# Patient Record
Sex: MALE | Race: ASIAN | NOT HISPANIC OR LATINO | URBAN - METROPOLITAN AREA
[De-identification: names, ages, dates, MRNs, and addresses within clinical notes are randomized per-mention and may not be internally consistent; named-entity substitution may affect disease eponyms.]

---

## 2017-07-09 ENCOUNTER — EMERGENCY (EMERGENCY)
Facility: HOSPITAL | Age: 47
LOS: 1 days | Discharge: HOME | End: 2017-07-09
Attending: EMERGENCY MEDICINE | Admitting: EMERGENCY MEDICINE
Payer: MEDICAID

## 2017-07-09 VITALS
OXYGEN SATURATION: 100 % | TEMPERATURE: 98 F | HEART RATE: 83 BPM | SYSTOLIC BLOOD PRESSURE: 145 MMHG | RESPIRATION RATE: 16 BRPM | DIASTOLIC BLOOD PRESSURE: 85 MMHG

## 2017-07-09 VITALS
RESPIRATION RATE: 18 BRPM | HEART RATE: 78 BPM | SYSTOLIC BLOOD PRESSURE: 116 MMHG | DIASTOLIC BLOOD PRESSURE: 94 MMHG | OXYGEN SATURATION: 100 %

## 2017-07-09 PROCEDURE — 99284 EMERGENCY DEPT VISIT MOD MDM: CPT | Mod: 25

## 2017-07-09 RX ORDER — KETOROLAC TROMETHAMINE 30 MG/ML
30 SYRINGE (ML) INJECTION ONCE
Qty: 0 | Refills: 0 | Status: DISCONTINUED | OUTPATIENT
Start: 2017-07-09 | End: 2017-07-09

## 2017-07-09 RX ADMIN — Medication 30 MILLIGRAM(S): at 05:44

## 2017-07-09 RX ADMIN — Medication 30 MILLIGRAM(S): at 06:15

## 2017-07-09 NOTE — ED PROVIDER NOTE - PHYSICAL EXAMINATION
mild tenderness of medial aspect of the elbow, no overlying skin changes, no edema. full range of motion, full strength. mild tenderness of medial aspect of the elbow, no overlying skin changes, no edema. full range of motion, full strength. no warmth  no LE edema, normal equal distal pulses.   sensation intact.

## 2017-07-09 NOTE — ED ADULT NURSE NOTE - CHIEF COMPLAINT
The patient is a 46y Male complaining of right elbow pain since March after banging elbow on tools in garage.

## 2017-07-09 NOTE — ED ADULT NURSE NOTE - OBJECTIVE STATEMENT
Received pt in room 28, ambulatory, pt A&Ox4, respirations even and unlabored b/l. Full ROM of right arm noted. Awaiting MD oconnor. Will continue to monitor.
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
## 2017-07-09 NOTE — ED PROVIDER NOTE - MEDICAL DECISION MAKING DETAILS
46yoM no pmh pw 3 months of medial elbow pain consistent with medial epicondylitis. will give IM toradaol for pain, discharge with [facility] follow up.

## 2017-07-09 NOTE — ED PROVIDER NOTE - PROGRESS NOTE DETAILS
PT seen and reassessed.  Patient symptomatically improved.   AAOX3, NAD, VSS.   Patient verbalized understanding of hospital course and outpatient plans, has decisional making capacity.  Will follow-up with Sports Medicine doctor in the next 5-7 days; patient will call for an appointment. Will return to the ED if there is any worsening of symptoms.  Patient able to ambulate w/o difficulty, is tolerating PO intake

## 2017-07-09 NOTE — ED PROVIDER NOTE - CARE PLAN
Principal Discharge DX:	Medial epicondylitis of elbow, right Principal Discharge DX:	Medial epicondylitis of elbow, right  Instructions for follow-up, activity and diet:	1) Please follow-up with your primary care doctor in the next 5-7 days.  Please call tomorrow for an appointment.  If you cannot follow-up with your primary care doctor please return to the ED for any urgent issues.  2) You were given a copy of the tests performed today.  Please bring the results with you and review them with your primary care doctor.  3) If you have any worsening of symptoms or any other concerns please return to the ED immediately.  4) Please continue taking your home medications as directed.

## 2017-07-09 NOTE — ED PROVIDER NOTE - ATTENDING CONTRIBUTION TO CARE
46M no PMh p/w positional R medial elbow pain x3mos. Works as . Vitals wnl, exam as above.  ddx: Likely MSK/medical epicondylitis.  Supportive care, outpt ortho f/u. Comfortable for dc.

## 2017-07-09 NOTE — ED ADULT TRIAGE NOTE - CHIEF COMPLAINT QUOTE
pt comes to ED for R elbow pain x 3 months. pt denies can move arm freely without difficultly. pt has elbow wrapped with ace bandage. pt denies going to ER, PCP, or taking anything for pain since it started. pt appears very comfortable. pt eating during triage. VSS

## 2017-07-18 ENCOUNTER — APPOINTMENT (OUTPATIENT)
Age: 47
End: 2017-07-18

## 2017-07-18 DIAGNOSIS — M77.01 MEDIAL EPICONDYLITIS, RIGHT ELBOW: ICD-10-CM

## 2017-07-18 RX ORDER — METHYLPREDNISOLONE 4 MG/1
4 TABLET ORAL
Qty: 21 | Refills: 0 | Status: ACTIVE | COMMUNITY
Start: 2017-07-18 | End: 1900-01-01

## 2017-08-15 ENCOUNTER — APPOINTMENT (OUTPATIENT)
Age: 47
End: 2017-08-15
Payer: MEDICAID

## 2017-08-15 PROCEDURE — 99213 OFFICE O/P EST LOW 20 MIN: CPT

## 2018-01-09 ENCOUNTER — APPOINTMENT (OUTPATIENT)
Age: 48
End: 2018-01-09
Payer: MEDICAID

## 2018-01-09 PROCEDURE — 76882 US LMTD JT/FCL EVL NVASC XTR: CPT

## 2018-01-09 PROCEDURE — 99214 OFFICE O/P EST MOD 30 MIN: CPT | Mod: 25

## 2018-01-09 PROCEDURE — 20553 NJX 1/MLT TRIGGER POINTS 3/>: CPT

## 2018-01-30 ENCOUNTER — APPOINTMENT (OUTPATIENT)
Age: 48
End: 2018-01-30
Payer: MEDICAID

## 2018-01-30 PROCEDURE — 76942 ECHO GUIDE FOR BIOPSY: CPT

## 2018-01-30 PROCEDURE — 20553 NJX 1/MLT TRIGGER POINTS 3/>: CPT

## 2018-01-30 PROCEDURE — 99213 OFFICE O/P EST LOW 20 MIN: CPT | Mod: 25

## 2018-02-20 ENCOUNTER — APPOINTMENT (OUTPATIENT)
Age: 48
End: 2018-02-20
Payer: MEDICAID

## 2018-02-20 DIAGNOSIS — M77.00 MEDIAL EPICONDYLITIS, UNSPECIFIED ELBOW: ICD-10-CM

## 2018-02-20 PROCEDURE — 20553 NJX 1/MLT TRIGGER POINTS 3/>: CPT

## 2018-02-20 PROCEDURE — 76942 ECHO GUIDE FOR BIOPSY: CPT

## 2018-02-20 PROCEDURE — 99214 OFFICE O/P EST MOD 30 MIN: CPT | Mod: 25

## 2018-03-13 ENCOUNTER — APPOINTMENT (OUTPATIENT)
Age: 48
End: 2018-03-13
Payer: MEDICAID

## 2018-03-13 PROCEDURE — 99213 OFFICE O/P EST LOW 20 MIN: CPT

## 2018-03-18 ENCOUNTER — EMERGENCY (EMERGENCY)
Facility: HOSPITAL | Age: 48
LOS: 1 days | Discharge: HOME | End: 2018-03-18
Admitting: EMERGENCY MEDICINE
Payer: MEDICAID

## 2018-03-18 VITALS
DIASTOLIC BLOOD PRESSURE: 78 MMHG | OXYGEN SATURATION: 97 % | SYSTOLIC BLOOD PRESSURE: 136 MMHG | HEART RATE: 99 BPM | TEMPERATURE: 98 F | RESPIRATION RATE: 16 BRPM

## 2018-03-18 PROCEDURE — 99283 EMERGENCY DEPT VISIT LOW MDM: CPT
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
## 2018-03-19 LAB
HIV1 AG SER QL: SIGNIFICANT CHANGE UP
HIV1+2 AB SPEC QL: SIGNIFICANT CHANGE UP
T PALLIDUM AB TITR SER: NEGATIVE — SIGNIFICANT CHANGE UP

## 2018-03-19 RX ORDER — CLOTRIMAZOLE AND BETAMETHASONE DIPROPIONATE 10; .5 MG/G; MG/G
1 CREAM TOPICAL
Qty: 60 | Refills: 0 | OUTPATIENT
Start: 2018-03-19 | End: 2018-03-25

## 2018-03-19 RX ORDER — CLOTRIMAZOLE AND BETAMETHASONE DIPROPIONATE 10; .5 MG/G; MG/G
1 CREAM TOPICAL ONCE
Qty: 0 | Refills: 0 | Status: COMPLETED | OUTPATIENT
Start: 2018-03-19 | End: 2018-03-19

## 2018-03-19 RX ADMIN — CLOTRIMAZOLE AND BETAMETHASONE DIPROPIONATE 1 APPLICATION(S): 10; .5 CREAM TOPICAL at 01:37

## 2018-03-19 NOTE — ED PROVIDER NOTE - GENITOURINARY [-], MLM
no dysuria or penile discharge no hematuria/no pelvic pain/no STD exposure/no difficulty urinating/no dysuria or penile discharge

## 2018-03-19 NOTE — ED PROVIDER NOTE - INGUINAL REGION
no swelling/erythema noted to the b/l inguinal region. No ulcerations. no chancre erythema noted to the b/l inguinal region. No ulcerations. no chancre/no swelling/no tenderness

## 2018-03-19 NOTE — ED PROVIDER NOTE - PLAN OF CARE
Follow up with your PMD within 48-72 hrs. Apply the cream to the area with rash 2x/day for 7-10 days.  Keep the area clean and dry- do not wear tight clothing. Worsening, continued or ANY new concerning symptoms return to the emergency department.   Call [phone] for results to your STD tests between 9am and 2:30pm
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
## 2018-03-19 NOTE — ED PROVIDER NOTE - CARE PLAN
Principal Discharge DX:	Tinea cruris  Assessment and plan of treatment:	Follow up with your PMD within 48-72 hrs. Apply the cream to the area with rash 2x/day for 7-10 days.  Keep the area clean and dry- do not wear tight clothing. Worsening, continued or ANY new concerning symptoms return to the emergency department.   Call [phone] for results to your STD tests between 9am and 2:30pm

## 2018-03-19 NOTE — ED PROVIDER NOTE - MEDICAL DECISION MAKING DETAILS
46 y/o M pt with no sig PMHx, presenting with Pruritis to the b/l groin area for 4 days. Likely Tenia Cruris. Plan to treat with antifungal cream. Educations. PMD f/u 48 y/o M pt with no sig PMHx, presenting with erythema and pruritis to the b/l groin area for 4 days. Likely Tenia Cruris. Plan to treat with antifungal cream. Draw GC/Chlamydia, HIV at pt request. Education. PMD f/u

## 2018-03-20 LAB
C TRACH RRNA SPEC QL NAA+PROBE: SIGNIFICANT CHANGE UP
N GONORRHOEA RRNA SPEC QL NAA+PROBE: SIGNIFICANT CHANGE UP
SPECIMEN SOURCE: SIGNIFICANT CHANGE UP

## 2018-04-03 ENCOUNTER — APPOINTMENT (OUTPATIENT)
Age: 48
End: 2018-04-03
Payer: MEDICAID

## 2018-04-03 DIAGNOSIS — M77.01 MEDIAL EPICONDYLITIS, RIGHT ELBOW: ICD-10-CM

## 2018-04-03 PROCEDURE — 99214 OFFICE O/P EST MOD 30 MIN: CPT | Mod: 25

## 2018-04-03 PROCEDURE — 76942 ECHO GUIDE FOR BIOPSY: CPT

## 2018-04-03 PROCEDURE — 20551 NJX 1 TENDON ORIGIN/INSJ: CPT

## 2018-05-01 ENCOUNTER — APPOINTMENT (OUTPATIENT)
Age: 48
End: 2018-05-01
Payer: MEDICAID

## 2018-05-01 PROCEDURE — 99214 OFFICE O/P EST MOD 30 MIN: CPT

## 2018-12-07 ENCOUNTER — EMERGENCY (EMERGENCY)
Facility: HOSPITAL | Age: 48
LOS: 1 days | Discharge: HOME | End: 2018-12-07
Attending: EMERGENCY MEDICINE | Admitting: EMERGENCY MEDICINE
Payer: MEDICAID

## 2018-12-07 VITALS
OXYGEN SATURATION: 95 % | HEART RATE: 129 BPM | TEMPERATURE: 98 F | RESPIRATION RATE: 16 BRPM | DIASTOLIC BLOOD PRESSURE: 107 MMHG | SYSTOLIC BLOOD PRESSURE: 166 MMHG
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
## 2018-12-07 DIAGNOSIS — S01.111A LACERATION WITHOUT FOREIGN BODY OF RIGHT EYELID AND PERIOCULAR AREA, INITIAL ENCOUNTER: ICD-10-CM

## 2018-12-07 DIAGNOSIS — S00.83XA CONTUSION OF OTHER PART OF HEAD, INITIAL ENCOUNTER: ICD-10-CM
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
## 2018-12-07 DIAGNOSIS — T74.11XA ADULT PHYSICAL ABUSE, CONFIRMED, INITIAL ENCOUNTER: ICD-10-CM
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
## 2018-12-07 DIAGNOSIS — S60.511A ABRASION OF RIGHT HAND, INITIAL ENCOUNTER: ICD-10-CM

## 2018-12-07 DIAGNOSIS — Z23 ENCOUNTER FOR IMMUNIZATION: ICD-10-CM

## 2018-12-07 PROCEDURE — 73130 X-RAY EXAM OF HAND: CPT | Mod: 26,RT

## 2018-12-07 PROCEDURE — 99284 EMERGENCY DEPT VISIT MOD MDM: CPT | Mod: 25

## 2018-12-07 RX ORDER — TETANUS TOXOID, REDUCED DIPHTHERIA TOXOID AND ACELLULAR PERTUSSIS VACCINE, ADSORBED 5; 2.5; 8; 8; 2.5 [IU]/.5ML; [IU]/.5ML; UG/.5ML; UG/.5ML; UG/.5ML
0.5 SUSPENSION INTRAMUSCULAR ONCE
Qty: 0 | Refills: 0 | Status: COMPLETED | OUTPATIENT
Start: 2018-12-07 | End: 2018-12-07
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
## 2018-12-07 NOTE — ED PROVIDER NOTE - MEDICAL DECISION MAKING DETAILS
Will CT head, face, C-spine.  Will maintain C-collar until after CT read.  Will xray R hand.  Pt requests small dose of Tylenol.  Ice applied.  Lac to R eyebrow is very small - will repair with dermabond.  Discussed wound care instructions and abrasions dressed with bacitracin.  Tetanus updated.      CT negative for acute pathology.  Discussed chronic T1 spinous process fracture with pt.  Given copies of reports.  Will d/c with instructions to f/u with PCP. Will CT head, face, C-spine.  Will maintain C-collar until after CT read.  Will xray R hand.  Pt requests small dose of Tylenol.  Ice applied.  Lac to R eyebrow is very small - will repair with dermabond.  Discussed wound care instructions and abrasions dressed with bacitracin.  Tetanus updated.      CT negative for acute pathology.  Discussed chronic T1 spinous process fracture with pt.  He has no pain over the area and no TTP.  He reports a fall a few years ago and sustained a rib injury but denies having his spine evaluate.  Given copies of reports and will take to his PCP.  Discussed emergent return precautions.

## 2018-12-07 NOTE — ED PROVIDER NOTE - PHYSICAL EXAMINATION
VITAL SIGNS: I have reviewed nursing notes and confirm.  CONSTITUTIONAL: Well-developed; well-nourished; appears rattled but consolable.  SKIN: +small abrasion over R MCP, L 3rd MCP, and L anterior knee.  +Ecchymoses over L orbit and L forehead.  +Deep abrasion to upper midline frontal scalp with underlying hematoma.  +0.75cm laceration to R eyebrow with underlying hematoma.   HEAD: Normocephalic; atraumatic.  EYES: PERRL, EOM intact; conjunctiva and sclera clear.  ENT: No nasal discharge; airway clear.  NECK: Supple; non tender.  CARD: S1, S2 normal; no murmurs, gallops, or rubs. Regular rate and rhythm.  RESP: No wheezes, rales or rhonchi.  ABD: Normal bowel sounds; soft; non-distended; non-tender; no hepatosplenomegaly.  EXT: Normal ROM but has TTP over R first MCP joint - NVID.  No point TTP over other major joints.  Normal gait.   NEURO: Patient is alert, oriented x person, place and time.  Cranial nerves 2-12 are intact.  Normal gait and speech.  Cerebellar testing normal:  negative Romberg, normal coordination and normal finger to nose, heal to shin and rapid alternating movements.  Normal proprioception and sensory exam.  No pronator drift.  5/5 bl upper extremity and lower extremity strength.  PSYCH: Cooperative, appropriate.

## 2018-12-07 NOTE — ED PROVIDER NOTE - NSFOLLOWUPINSTRUCTIONS_ED_ALL_ED_FT
PLEASE FOLLOW-UP WITH YOUR PRIMARY CARE DOCTOR IN 1-2 DAY FOR FURTHER EVALUATION.  PLEASE TAKE ALL PAPERWORK FROM TODAY'S VISIT TO YOUR PRIMARY DOCTOR.  IF YOU DO NOT HAVE A PRIMARY CARE DOCTOR PLEASE CALL [phone] AND ASK FOR MS. TOMAS CASTELLON.  SHE CAN HELP YOU MAKE A FOLLOW-UP APPOINTMENT.  HER HOURS ARE 11AM-7PM MONDAY - FRIDAY.    Please ice area of pain for the next 2 days.  Ice for 20 minutes at a time at least 3 times a day.

## 2018-12-08 VITALS
RESPIRATION RATE: 16 BRPM | HEART RATE: 110 BPM | SYSTOLIC BLOOD PRESSURE: 128 MMHG | DIASTOLIC BLOOD PRESSURE: 67 MMHG | OXYGEN SATURATION: 100 %

## 2018-12-08 PROCEDURE — 70450 CT HEAD/BRAIN W/O DYE: CPT | Mod: 26

## 2018-12-08 PROCEDURE — 70486 CT MAXILLOFACIAL W/O DYE: CPT | Mod: 26

## 2018-12-08 PROCEDURE — 73130 X-RAY EXAM OF HAND: CPT | Mod: 26,RT

## 2018-12-08 PROCEDURE — 72125 CT NECK SPINE W/O DYE: CPT | Mod: 26

## 2018-12-08 RX ORDER — BACITRACIN ZINC 500 UNIT/G
1 OINTMENT IN PACKET (EA) TOPICAL ONCE
Qty: 0 | Refills: 0 | Status: COMPLETED | OUTPATIENT
Start: 2018-12-08 | End: 2018-12-08

## 2018-12-08 RX ORDER — ACETAMINOPHEN 500 MG
325 TABLET ORAL ONCE
Qty: 0 | Refills: 0 | Status: COMPLETED | OUTPATIENT
Start: 2018-12-08 | End: 2018-12-08

## 2018-12-08 RX ADMIN — Medication 325 MILLIGRAM(S): at 00:37

## 2018-12-08 RX ADMIN — Medication 1 APPLICATION(S): at 00:37

## 2018-12-08 RX ADMIN — TETANUS TOXOID, REDUCED DIPHTHERIA TOXOID AND ACELLULAR PERTUSSIS VACCINE, ADSORBED 0.5 MILLILITER(S): 5; 2.5; 8; 8; 2.5 SUSPENSION INTRAMUSCULAR at 00:38

## 2019-01-10 ENCOUNTER — EMERGENCY (EMERGENCY)
Facility: HOSPITAL | Age: 49
LOS: 1 days | Discharge: HOME | End: 2019-01-10
Attending: EMERGENCY MEDICINE
Payer: MEDICAID

## 2019-01-10 VITALS
OXYGEN SATURATION: 99 % | HEART RATE: 78 BPM | SYSTOLIC BLOOD PRESSURE: 141 MMHG | DIASTOLIC BLOOD PRESSURE: 89 MMHG | RESPIRATION RATE: 18 BRPM

## 2019-01-10 VITALS
OXYGEN SATURATION: 100 % | SYSTOLIC BLOOD PRESSURE: 152 MMHG | HEART RATE: 91 BPM | RESPIRATION RATE: 18 BRPM | DIASTOLIC BLOOD PRESSURE: 94 MMHG

## 2019-01-10 LAB
ALBUMIN SERPL ELPH-MCNC: 4.6 G/DL — SIGNIFICANT CHANGE UP (ref 3.3–5)
ALP SERPL-CCNC: 77 U/L — SIGNIFICANT CHANGE UP (ref 40–120)
ALT FLD-CCNC: 23 U/L — SIGNIFICANT CHANGE UP (ref 10–45)
ANION GAP SERPL CALC-SCNC: 16 MMOL/L — SIGNIFICANT CHANGE UP (ref 5–17)
APTT BLD: 27.5 SEC — SIGNIFICANT CHANGE UP (ref 27.5–36.3)
AST SERPL-CCNC: 12 U/L — SIGNIFICANT CHANGE UP (ref 10–40)
BASOPHILS # BLD AUTO: 0 K/UL — SIGNIFICANT CHANGE UP (ref 0–0.2)
BILIRUB SERPL-MCNC: 0.2 MG/DL — SIGNIFICANT CHANGE UP (ref 0.2–1.2)
BLD GP AB SCN SERPL QL: NEGATIVE — SIGNIFICANT CHANGE UP
BUN SERPL-MCNC: 22 MG/DL — SIGNIFICANT CHANGE UP (ref 7–23)
CALCIUM SERPL-MCNC: 9.3 MG/DL — SIGNIFICANT CHANGE UP (ref 8.4–10.5)
CHLORIDE SERPL-SCNC: 101 MMOL/L — SIGNIFICANT CHANGE UP (ref 96–108)
CO2 SERPL-SCNC: 23 MMOL/L — SIGNIFICANT CHANGE UP (ref 22–31)
CREAT SERPL-MCNC: 1.14 MG/DL — SIGNIFICANT CHANGE UP (ref 0.5–1.3)
EOSINOPHIL # BLD AUTO: 0.3 K/UL — SIGNIFICANT CHANGE UP (ref 0–0.5)
EOSINOPHIL NFR BLD AUTO: 3 % — SIGNIFICANT CHANGE UP (ref 0–6)
GAS PNL BLDV: SIGNIFICANT CHANGE UP
GAS PNL BLDV: SIGNIFICANT CHANGE UP
GLUCOSE SERPL-MCNC: 106 MG/DL — HIGH (ref 70–99)
HCT VFR BLD CALC: 43.9 % — SIGNIFICANT CHANGE UP (ref 39–50)
HGB BLD-MCNC: 15.1 G/DL — SIGNIFICANT CHANGE UP (ref 13–17)
INR BLD: 0.97 RATIO — SIGNIFICANT CHANGE UP (ref 0.88–1.16)
LYMPHOCYTES # BLD AUTO: 47 % — HIGH (ref 13–44)
LYMPHOCYTES # BLD AUTO: 7.8 K/UL — HIGH (ref 1–3.3)
MCHC RBC-ENTMCNC: 28.6 PG — SIGNIFICANT CHANGE UP (ref 27–34)
MCHC RBC-ENTMCNC: 34.4 GM/DL — SIGNIFICANT CHANGE UP (ref 32–36)
MCV RBC AUTO: 83.1 FL — SIGNIFICANT CHANGE UP (ref 80–100)
MONOCYTES # BLD AUTO: 0.8 K/UL — SIGNIFICANT CHANGE UP (ref 0–0.9)
MONOCYTES NFR BLD AUTO: 5 % — SIGNIFICANT CHANGE UP (ref 2–14)
NEUTROPHILS # BLD AUTO: 4.3 K/UL — SIGNIFICANT CHANGE UP (ref 1.8–7.4)
NEUTROPHILS NFR BLD AUTO: 32 % — LOW (ref 43–77)
PLATELET # BLD AUTO: 232 K/UL — SIGNIFICANT CHANGE UP (ref 150–400)
POTASSIUM SERPL-MCNC: 4.3 MMOL/L — SIGNIFICANT CHANGE UP (ref 3.5–5.3)
POTASSIUM SERPL-SCNC: 4.3 MMOL/L — SIGNIFICANT CHANGE UP (ref 3.5–5.3)
PROT SERPL-MCNC: 7.7 G/DL — SIGNIFICANT CHANGE UP (ref 6–8.3)
PROTHROM AB SERPL-ACNC: 11 SEC — SIGNIFICANT CHANGE UP (ref 10–12.9)
RBC # BLD: 5.28 M/UL — SIGNIFICANT CHANGE UP (ref 4.2–5.8)
RBC # FLD: 12.1 % — SIGNIFICANT CHANGE UP (ref 10.3–14.5)
RH IG SCN BLD-IMP: POSITIVE — SIGNIFICANT CHANGE UP
SODIUM SERPL-SCNC: 140 MMOL/L — SIGNIFICANT CHANGE UP (ref 135–145)
TROPONIN T, HIGH SENSITIVITY RESULT: <6 NG/L — SIGNIFICANT CHANGE UP (ref 0–51)
WBC # BLD: 13.1 K/UL — HIGH (ref 3.8–10.5)
WBC # FLD AUTO: 13.1 K/UL — HIGH (ref 3.8–10.5)

## 2019-01-10 PROCEDURE — 71045 X-RAY EXAM CHEST 1 VIEW: CPT

## 2019-01-10 PROCEDURE — 73110 X-RAY EXAM OF WRIST: CPT

## 2019-01-10 PROCEDURE — 82803 BLOOD GASES ANY COMBINATION: CPT

## 2019-01-10 PROCEDURE — 86850 RBC ANTIBODY SCREEN: CPT

## 2019-01-10 PROCEDURE — 96375 TX/PRO/DX INJ NEW DRUG ADDON: CPT | Mod: XU

## 2019-01-10 PROCEDURE — 70450 CT HEAD/BRAIN W/O DYE: CPT

## 2019-01-10 PROCEDURE — 74177 CT ABD & PELVIS W/CONTRAST: CPT | Mod: 26

## 2019-01-10 PROCEDURE — 73562 X-RAY EXAM OF KNEE 3: CPT | Mod: 26,LT

## 2019-01-10 PROCEDURE — 70486 CT MAXILLOFACIAL W/O DYE: CPT

## 2019-01-10 PROCEDURE — 72125 CT NECK SPINE W/O DYE: CPT

## 2019-01-10 PROCEDURE — 70450 CT HEAD/BRAIN W/O DYE: CPT | Mod: 26

## 2019-01-10 PROCEDURE — 82962 GLUCOSE BLOOD TEST: CPT

## 2019-01-10 PROCEDURE — 70486 CT MAXILLOFACIAL W/O DYE: CPT | Mod: 26

## 2019-01-10 PROCEDURE — 72170 X-RAY EXAM OF PELVIS: CPT | Mod: 26

## 2019-01-10 PROCEDURE — 73130 X-RAY EXAM OF HAND: CPT

## 2019-01-10 PROCEDURE — 80053 COMPREHEN METABOLIC PANEL: CPT

## 2019-01-10 PROCEDURE — 71260 CT THORAX DX C+: CPT

## 2019-01-10 PROCEDURE — 74177 CT ABD & PELVIS W/CONTRAST: CPT

## 2019-01-10 PROCEDURE — 85730 THROMBOPLASTIN TIME PARTIAL: CPT

## 2019-01-10 PROCEDURE — 93005 ELECTROCARDIOGRAM TRACING: CPT | Mod: XU

## 2019-01-10 PROCEDURE — 84132 ASSAY OF SERUM POTASSIUM: CPT

## 2019-01-10 PROCEDURE — 29125 APPL SHORT ARM SPLINT STATIC: CPT | Mod: RT

## 2019-01-10 PROCEDURE — 96374 THER/PROPH/DIAG INJ IV PUSH: CPT | Mod: XU

## 2019-01-10 PROCEDURE — 86901 BLOOD TYPING SEROLOGIC RH(D): CPT

## 2019-01-10 PROCEDURE — 84484 ASSAY OF TROPONIN QUANT: CPT

## 2019-01-10 PROCEDURE — 99285 EMERGENCY DEPT VISIT HI MDM: CPT | Mod: 25

## 2019-01-10 PROCEDURE — 83605 ASSAY OF LACTIC ACID: CPT

## 2019-01-10 PROCEDURE — 73130 X-RAY EXAM OF HAND: CPT | Mod: 26,RT

## 2019-01-10 PROCEDURE — 71260 CT THORAX DX C+: CPT | Mod: 26

## 2019-01-10 PROCEDURE — 86900 BLOOD TYPING SEROLOGIC ABO: CPT

## 2019-01-10 PROCEDURE — 29125 APPL SHORT ARM SPLINT STATIC: CPT

## 2019-01-10 PROCEDURE — 72170 X-RAY EXAM OF PELVIS: CPT

## 2019-01-10 PROCEDURE — 73110 X-RAY EXAM OF WRIST: CPT | Mod: 26,RT

## 2019-01-10 PROCEDURE — 85027 COMPLETE CBC AUTOMATED: CPT

## 2019-01-10 PROCEDURE — 84295 ASSAY OF SERUM SODIUM: CPT

## 2019-01-10 PROCEDURE — 82330 ASSAY OF CALCIUM: CPT

## 2019-01-10 PROCEDURE — 85610 PROTHROMBIN TIME: CPT

## 2019-01-10 PROCEDURE — 82435 ASSAY OF BLOOD CHLORIDE: CPT

## 2019-01-10 PROCEDURE — 73562 X-RAY EXAM OF KNEE 3: CPT

## 2019-01-10 PROCEDURE — 71045 X-RAY EXAM CHEST 1 VIEW: CPT | Mod: 26

## 2019-01-10 PROCEDURE — 82947 ASSAY GLUCOSE BLOOD QUANT: CPT

## 2019-01-10 PROCEDURE — 99284 EMERGENCY DEPT VISIT MOD MDM: CPT | Mod: 25

## 2019-01-10 PROCEDURE — 72125 CT NECK SPINE W/O DYE: CPT | Mod: 26

## 2019-01-10 PROCEDURE — 85014 HEMATOCRIT: CPT

## 2019-01-10 RX ORDER — FENTANYL CITRATE 50 UG/ML
50 INJECTION INTRAVENOUS ONCE
Qty: 0 | Refills: 0 | Status: DISCONTINUED | OUTPATIENT
Start: 2019-01-10 | End: 2019-01-10

## 2019-01-10 RX ORDER — ONDANSETRON 8 MG/1
4 TABLET, FILM COATED ORAL ONCE
Qty: 0 | Refills: 0 | Status: COMPLETED | OUTPATIENT
Start: 2019-01-10 | End: 2019-01-10

## 2019-01-10 RX ORDER — ACETAMINOPHEN 500 MG
1000 TABLET ORAL ONCE
Qty: 0 | Refills: 0 | Status: COMPLETED | OUTPATIENT
Start: 2019-01-10 | End: 2019-01-10

## 2019-01-10 RX ORDER — SODIUM CHLORIDE 9 MG/ML
1000 INJECTION INTRAMUSCULAR; INTRAVENOUS; SUBCUTANEOUS ONCE
Qty: 0 | Refills: 0 | Status: COMPLETED | OUTPATIENT
Start: 2019-01-10 | End: 2019-01-10

## 2019-01-10 RX ADMIN — Medication 400 MILLIGRAM(S): at 05:16

## 2019-01-10 RX ADMIN — SODIUM CHLORIDE 1000 MILLILITER(S): 9 INJECTION INTRAMUSCULAR; INTRAVENOUS; SUBCUTANEOUS at 06:32

## 2019-01-10 RX ADMIN — Medication 1000 MILLIGRAM(S): at 06:30

## 2019-01-10 RX ADMIN — ONDANSETRON 4 MILLIGRAM(S): 8 TABLET, FILM COATED ORAL at 05:16

## 2019-01-10 NOTE — ED PROVIDER NOTE - ATTENDING CONTRIBUTION TO CARE
****ATTENDING**** 47yo m no pmhx BIB EMS s/p fall. As per patient and daughter, pt not sure how he fell 8-10 steps. + trauma to the head and loc with now anterograde amnesia. Pt denies chest pain, palp, sob. No abd/back pain. Complains of jaw pain and R wrist pain. States he works as a .  ON exam, Patient is awake, alert x 1. Confused about location and event. GCS15. NCAT, PERRL. C Collar in place, No Posterior midline cervical spine tenderness. + facial tenderness. EOMI. Chest is clear to auscultation. +S1S2. Abdomen is soft nondistended/nontender +BS. No rebound or guarding.  Pelvis is stable. Full ROM B/L hips. Back non tender midline T/L spine. + R wrist pain and tenderness. + radial pulse, nml sensation. L knee abrasion.   Check labs, CT and xray to eval for injury.

## 2019-01-10 NOTE — ED PROVIDER NOTE - MEDICAL DECISION MAKING DETAILS
49yo M w/ no significant pmhx BIBEMS s/p unwitnessed fall down about 8-9 steps, landed on wooden fall, (+) LOC, was unconscious for about 30 seconds as per daughter, upon waking up, pt had no recollection of the preceding events, complaining of jaw pain, right hand/wrist pain and left knee pain.  As per daughter, Pt is not on any blood thinner, pt was not drinking EtOH, no hx of heart disease, no shaking or any hx of seizure.  - Labs, CT and pain meds

## 2019-01-10 NOTE — ED PROVIDER NOTE - OBJECTIVE STATEMENT
49yo M w/ no significant pmhx BIBEMS s/p unwitnessed fall down about 8-9 steps, landed on wooden fall, (+) LOC, was unconscious for about 30 seconds as per daughter, upon waking up, pt had no recollection of the preceding events, complaining of jaw pain, right hand/wrist pain and left knee pain.  As per daughter, Pt is not on any blood thinner, pt was not drinking EtOH, no hx of heart disease, no shaking or any hx of seizure.

## 2019-01-10 NOTE — ED PROVIDER NOTE - NSFOLLOWUPINSTRUCTIONS_ED_ALL_ED_FT
Please follow up with an orthopedic surgeon in one week in regards to your symptoms.  A list of orthopedic doctors will be given to you.  Please keep splint on until you are able to see the orthopedic surgeon.    Take Tylenol 1g every six hours and supplement with ibuprofen 600mg, with food, every six hours which can be taken three hours apart from the Tylenol to have a layered effect.  Drink at least 2 Liters or 64 Ounces of water each day.  Return for any persistent, worsening symptoms, or ANY concerns at all.

## 2019-01-10 NOTE — ED PROVIDER NOTE - PROGRESS NOTE DETAILS
pt feeling much better, tolerated po, remembers what happened, able to ambulate, given follow up instructions -christian

## 2019-01-10 NOTE — ED ADULT NURSE NOTE - NSIMPLEMENTINTERV_GEN_ALL_ED
Implemented All Fall Risk Interventions:  [place] to call system. Call bell, personal items and telephone within reach. Instruct patient to call for assistance. Room bathroom lighting operational. Non-slip footwear when patient is off stretcher. Physically safe environment: no spills, clutter or unnecessary equipment. Stretcher in lowest position, wheels locked, appropriate side rails in place. Provide visual cue, wrist band, yellow gown, etc. Monitor gait and stability. Monitor for mental status changes and reorient to person, place, and time. Review medications for side effects contributing to fall risk. Reinforce activity limits and safety measures with patient and family.

## 2019-01-10 NOTE — ED ADULT NURSE REASSESSMENT NOTE - NS ED NURSE REASSESS COMMENT FT1
Cast applied by MD to right wrist & finger splint. Pt ambulated to restroom with RN assistance. Awaiting d/c at this time.

## 2019-01-10 NOTE — ED ADULT NURSE REASSESSMENT NOTE - NS ED NURSE REASSESS COMMENT FT1
Report received from Karena RIOS. AOx3, speaking in complete sentences. Unlabored, spontaneous respirations, NAD, O2 sat 99% RA. Equal strength and sensation in all 4 extremities, PERRL 3mm. 1L NS bolus infusing as ordered. Awaiting dispo. Family at bedside. Report received from Karena RIOS. AOx3, speaking in complete sentences. Unlabored, spontaneous respirations, NAD, O2 sat 99% RA. Cardiac monitor in place, NSR HR 86. Equal strength and sensation in all 4 extremities, PERRL 3mm. 1L NS bolus infusing as ordered. Awaiting dispo. Family at bedside.

## 2019-01-10 NOTE — ED PROVIDER NOTE - CARE PLAN
Principal Discharge DX:	Ulnar fracture  Secondary Diagnosis:	Fall Principal Discharge DX:	Head injury  Secondary Diagnosis:	Fall  Secondary Diagnosis:	Concussion

## 2019-01-18 ENCOUNTER — APPOINTMENT (OUTPATIENT)
Dept: ORTHOPEDIC SURGERY | Facility: CLINIC | Age: 49
End: 2019-01-18
Payer: MEDICAID

## 2019-01-18 VITALS — HEIGHT: 71 IN | WEIGHT: 200 LBS | BODY MASS INDEX: 28 KG/M2

## 2019-01-18 VITALS
WEIGHT: 200 LBS | HEIGHT: 71 IN | BODY MASS INDEX: 28 KG/M2 | HEART RATE: 72 BPM | DIASTOLIC BLOOD PRESSURE: 78 MMHG | SYSTOLIC BLOOD PRESSURE: 122 MMHG

## 2019-01-18 DIAGNOSIS — Z78.9 OTHER SPECIFIED HEALTH STATUS: ICD-10-CM

## 2019-01-18 DIAGNOSIS — M25.531 PAIN IN RIGHT WRIST: ICD-10-CM

## 2019-01-18 PROCEDURE — 99204 OFFICE O/P NEW MOD 45 MIN: CPT

## 2019-01-20 PROBLEM — M25.531 RIGHT WRIST PAIN: Status: ACTIVE | Noted: 2019-01-20

## 2019-01-20 NOTE — REASON FOR VISIT
[Initial Visit] : an initial visit for [Family Member] : family member [FreeTextEntry2] : Right wrist pain

## 2019-01-20 NOTE — PHYSICAL EXAM
[UE] : Sensory: Intact in bilateral upper extremities [Bicep] : biceps 2+ and symmetric bilaterally [B.R.] : biceps 2+ and symmetric bilaterally [Tricep] : triceps 2+ and symmetric bilaterally [Rad] : radial 2+ and symmetric bilaterally [Normal RUE] : Right Upper Extremity: No scars, rashes, lesions, ulcers, skin intact [Normal LUE] : Left Upper Extremity: No scars, rashes, lesions, ulcers, skin intact [Poor Appearance] : well-appearing [Acute Distress] : not in acute distress [Obese] : not obese [de-identified] : The patient has no respiratory distress. Mood and affect are normal. The patient is alert and oriented to person, place and time.\par The patient does not experience pain with motion of the shoulders or elbows. Examination of the right wrist and hand demonstrates minimal tenderness on the ulnar aspect of the wrist. There is minimal tenderness of the long finger PIP joint. Tendon function is intact throughout. There is no instability of the fingers or the wrist. The skin is intact. There is no lymphedema. Tinel signs are negative. [de-identified] : EXAM: WRIST COMPLETE RIGHT-MIN 3 VIEWS \par \par EXAM: HAND RIGHT (MINIMUM 3 VIEWS) \par \par \par PROCEDURE DATE: 01/10/2019 \par \par INTERPRETATION: CLINICAL INFORMATION: Right wrist pain. \par \par TECHNIQUE: 3 view x-ray of the right hand. \par 3 view radiograph of the right wrist. \par \par COMPARISON: None. \par \par FINDINGS: \par No acute fracture or dislocation. There is a chronic nonunion fracture of \par the ulnar styloid with approximately 3 mm of displacement of the distal \par well-corticated fracture fragment. No dislocation. Cartilage spaces are \par maintained. \par There is soft tissue swelling around the ulnar aspect of the wrist/distal \par forearm. \par \par IMPRESSION: \par 1. No acute fracture or dislocation. \par 2. 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

## 2019-01-20 NOTE — DISCUSSION/SUMMARY
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 In detailed questioning the patient thinks he may have had an injury a year ago. No treatment is necessary for this ulnar styloid nonunion. We will discontinue the brace. In terms of the long finger he had a sprain but there is no evidence of ligamentous instability, tenderness dysfunction or triggering. He will resume activities to tolerance. I would like to reevaluate him in 3 weeks.

## 2019-01-20 NOTE — HISTORY OF PRESENT ILLNESS
[de-identified] : 49 y/o man presents  for right wrist pain that began on 1/9/19 when he fell down the stairs. 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 He was given a splint and wrap and told to follow up outpatient. \par He has been taking Ibuprofen 800 with good relief and states that his pain has been improving every day. Denies N/T/R/prior injury. He also states that he has been having some pain in the long finger on the right hand since the injury.

## 2019-02-11 ENCOUNTER — APPOINTMENT (OUTPATIENT)
Dept: ORTHOPEDIC SURGERY | Facility: CLINIC | Age: 49
End: 2019-02-11
Payer: MEDICAID

## 2019-02-11 VITALS
HEIGHT: 71 IN | WEIGHT: 200 LBS | BODY MASS INDEX: 28 KG/M2 | HEART RATE: 80 BPM | DIASTOLIC BLOOD PRESSURE: 89 MMHG | SYSTOLIC BLOOD PRESSURE: 120 MMHG

## 2019-02-11 DIAGNOSIS — M79.641 PAIN IN RIGHT HAND: ICD-10-CM

## 2019-02-11 PROCEDURE — 99213 OFFICE O/P EST LOW 20 MIN: CPT

## 2019-02-21 NOTE — ED PROVIDER NOTE - CONSTITUTIONAL, MLM
Rose Garsia is a 15  year old young woman who is seen in consultation for iron deficiency anemia.  Rose was first noted to have KIESHA in 1/2017, she was treated with oral iron and her Hgb increased from 9.5 to 11.9 although from the records I reviewed her iron stores were never fully replete.  Rose was found to have recurrent KIESHA earlier this month.  Unfortunately she had a suicide attempt earlier this month and oral iron supplementation poses an additional risk in this setting.    Rose comes to clinic today with her mom.  Rose reports low energy that is long standing.  She is unsure if her fatigue is due to depression or anemia.  Her appetite is variable.  She reports having restricted her diet in the past due to comments people made to her about being overweight.  She denies current restriction.  She eats meat, at least small amounts, most days of the week.  She relies mostly on convenience foods. She does not chew ice, however she does eat deodorant.  No other pica. She denies constipation. No skin concerns or pain.     [facility]aven had Nexplanon placed a few weeks ago.  Prior to that, she describes her menstrual periods as heavy for the first 2 days of her 5 day cycle.  For the first days she would change pads/tampons every 2-3 hours during the day but not have to get up to change at night.  The rest of her cycle was light.  No other bleeding outside occasional short lived bloody nose when it is really dry.    Rose reports anxiety and depression that is long standing.  This interferes with her schooling and sleep.  She and her mom report she is well tied into mental health care.    Review of systems:  Remainder of ROS is complete and negative    PMH:   Past Medical History:   Diagnosis Date     Depression      History of pica      Iron deficiency anemia    no surgeries  4 past hospitalizations all for suicide attempts, most recent 3 weeks ago    PFMH:   Family History   Problem Relation Age of Onset      Depression Mother      Seizure Disorder Mother      Intellectual Disability (Mental Retardation) Father      Intellectual Disability (Mental Retardation) Brother      Bipolar Disorder Cousin    No family history of hemoglobinopathies that Rose or her mom are aware of.  Rose's brother has a different dad who has sickle cell.  Multiple family members on Mom's side with presumed iron deficiency anemia.  Mom had NHL, has completed treatment.    Social History: Lives at home with her mom and 12yo brother.    Current Medications:  Current Outpatient Medications   Medication Sig Dispense Refill     Prenatal Vit-Fe Fumarate-FA (PRENATAL MULTIVITAMIN W/IRON) 27-0.8 MG tablet Take 1 tablet by mouth daily 90 tablet 3     vitamin D2 (ERGOCALCIFEROL) 81978 units (1250 mcg) capsule Take 1 capsule (50,000 Units) by mouth once a week 6 capsule 0       Physical Exam: LMP 01/26/2019    Temp:  [98.5  F (36.9  C)] 98.5  F (36.9  C)  Pulse:  [95] 95  Resp:  [18] 18  BP: (122)/(68) 122/68  SpO2:  [100 %] 100 %  Wt Readings from Last 4 Encounters:   02/21/19 54.4 kg (119 lb 14.9 oz) (45 %)*   02/13/19 53.3 kg (117 lb 9.6 oz) (40 %)*   02/06/19 53.5 kg (118 lb) (41 %)*   04/26/17 57.3 kg (126 lb 6.4 oz) (66 %)*     * Growth percentiles are based on [facility] (Girls, 2-20 Years) data.     General: Rose Garsia is alert, interactive and appropriate for age throughout exam.    HEENT: Skull is atrauamatic and normocephalic. PERRLA, sclera are non icteric and not injected, EOM are intact.  Nares are patent without drainage.  Oropharynx is clear without exudate, erythema or lesions.    Lymph:  Neck is supple without lymphadenopathy.  There is no supraclavicular, axillray or inguinal lymphadenopathy palpated.  Cardiovascular:  HR is regular, S1, S2 no murmur.  Capillary refill is < 2 seconds.  There is no edema.  Respiratory: Respirations are easy.  Lungs are clear to auscultation through out.  No crackles or wheezes.  Gastrointestinal:  BS  present in all quadrants.  Abdomen is soft and non-tender.  No hepatosplenomegaly or masses are palpated.  Skin: Pale. No rashes, bruises or other skin lesions are noted.   Neurological:  Grossly intact.  Musculoskeletal:  Good strength and ROM in all extremities.      Labs:   Results for orders placed or performed in visit on 02/21/19   CBC with platelets differential   Result Value Ref Range    WBC 5.1 4.0 - 11.0 10e9/L    RBC Count 3.53 (L) 3.7 - 5.3 10e12/L    Hemoglobin 6.6 (LL) 11.7 - 15.7 g/dL    Hematocrit 23.6 (L) 35.0 - 47.0 %    MCV 67 (L) 77 - 100 fl    MCH 18.7 (L) 26.5 - 33.0 pg    MCHC 28.0 (L) 31.5 - 36.5 g/dL    RDW 22.1 (H) 10.0 - 15.0 %    Platelet Count 273 150 - 450 10e9/L    Diff Method Automated Method     % Neutrophils 49.2 %    % Lymphocytes 33.1 %    % Monocytes 15.1 %    % Eosinophils 2.2 %    % Basophils 0.2 %    % Immature Granulocytes 0.2 %    Nucleated RBCs 0 0 /100    Absolute Neutrophil 2.5 1.3 - 7.0 10e9/L    Absolute Lymphocytes 1.7 1.0 - 5.8 10e9/L    Absolute Monocytes 0.8 0.0 - 1.3 10e9/L    Absolute Eosinophils 0.1 0.0 - 0.7 10e9/L    Absolute Basophils 0.0 0.0 - 0.2 10e9/L    Abs Immature Granulocytes 0.0 0 - 0.4 10e9/L    Absolute Nucleated RBC 0.0    Ferritin   Result Value Ref Range    Ferritin 6 (L) 12 - 150 ng/mL     Assessment:  Rose Garsia is a 15 year old young woman with recurrent KIESHA.  This has been complicated by a recent suicide attempt so oral iron presents a significant risk for her with the overdose potential.  She has fatigue and pica, both likely secondary to her KIESHA although her depression may also be contributing to her fatigue.  From the history it does not seem she has menorrhagia.  No other bleeding.  No family history of hemoglobinopathy that they are aware of.    Plan:  1. Proceed with IV ferric carboxymaltose today.  Discussed infusion and potential side effects.  Also discussed that a second infusion may be necessary to fully replete her iron  stores.  2. Provided hand out on KIESHA, reviewed dietary recommendations as well as importance of consistent iron intake.  3. If KIESHA does not resolve as anticipated consider checking smear, Hgb ELP and stool guaiac.   4. Continue prenatal vitamin which contains 27mg of ferrous fumarate which is about 10mg of elemental iron.  This is a very low dose of iron but doesn't hurt to continue.  5. RTC in 2 weeks for repeat labs and possible second infusion of IV ferric carboxymaltose.     normal... Well appearing, well nourished, awake, alert, oriented to person, place, time/situation and in no apparent distress.

## 2021-02-09 ENCOUNTER — APPOINTMENT (OUTPATIENT)
Dept: SPORTS MEDICINE | Facility: CLINIC | Age: 51
End: 2021-02-09
Payer: COMMERCIAL

## 2021-02-09 DIAGNOSIS — M25.522 PAIN IN RIGHT ELBOW: ICD-10-CM

## 2021-02-09 DIAGNOSIS — M25.521 PAIN IN RIGHT ELBOW: ICD-10-CM

## 2021-02-09 PROCEDURE — 99072 ADDL SUPL MATRL&STAF TM PHE: CPT

## 2021-02-09 PROCEDURE — 73080 X-RAY EXAM OF ELBOW: CPT | Mod: LT

## 2021-02-09 PROCEDURE — 99214 OFFICE O/P EST MOD 30 MIN: CPT

## 2021-02-09 NOTE — PHYSICAL EXAM
[de-identified] : Physical Exam:\par General Appearance: Well-nourished, well developed in no acute distress\par Orientation: Oriented to person, place and time.             Mood / Affect: Calm\par Gait: normal           Coordination: normal\par Elbow Exam (Bilateral)\par Inspection/Palpation UE (R/L): no effusion or ttp over elbow joint on R; very mild TTP at medial epicondyle on L, none otherwise\par ROM full bilaterally\par No Lateral epicondyle pain with wrist extension against resistance\par No medical epicondyle pain with wrist flexion against resistance. \par Elbow ROM (R/L): 0-180/ 0-180\par Suppination (R/L): 80/80Pronation (R/L): 80/80\par Elbow Stability (R/L): no varus or valgus laxity bilaterally\par Biceps (R/L): 5/5 / 5/5                              Triceps (R/L): 5/5 / 5/5\par Wrist Extension (R/L): 5/5 / 5/5                Wrist Flexion (R/L): 5/5 / 5/5\par Intrinsics (R/L): 5/5 / 5/5\par Sensation: Subjective normal median, ulnar, radial and axillary sensation bilaterally\par Vasculature: 2+ radial pulse bilaterally\par UE Skin (R/L): no rashes or lesions bilaterally\par Lymph UE (R/L): no axillary lymphadenopathy [de-identified] : Three view XR b/l elbows -- AP, lateral, oblique -- performed today in office.  My interpretation shows no fractures; joint is reduced.  No effusion.  Normal soft tissue.

## 2021-02-09 NOTE — DISCUSSION/SUMMARY
[de-identified] : 50 M presenting with b/l elbow pain x 1 month exacerbated with gym workouts. Impression: 1) right lateral epicondylitis 2.) left medial epicondylitis 3.) right extensor muscle strain 4) left flexor muscle strain. Plan for PT at this time. Instructed patient to limit the exercise to 25% weight so long as this does not reproduce patient's pain. Patient to follow up in 4-6 weeks. Will consider PRP/prolo at that time depending on treatment course

## 2021-02-09 NOTE — HISTORY OF PRESENT ILLNESS
[de-identified] : 50 M RHD known to clinic presenting for new complaint of right lateral sharp elbow pain and left medial sharp elbow pain x 1 month. Symptoms intermittently occur and are worse after doing triceps/chinup workouts. No medications taken. No numbness/tingling/weakness. Patient had similar symptoms 3 years ago which was relieved with injections. \par \par Attending note -- agree with the above.  Treated for R medial epicondylitis approximately 3 years ago, now with 1 month insidious onset R lateral and L medial pain after initiating a weight lifting routine.  Describes symptom onset likely after doing seated barbell pulldown with a large amount of weight.  Has been having pain with biceps/triceps workouts since then.  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  Symptoms persistent, not improving, better with rest.

## 2021-03-23 ENCOUNTER — APPOINTMENT (OUTPATIENT)
Dept: SPORTS MEDICINE | Facility: CLINIC | Age: 51
End: 2021-03-23
Payer: COMMERCIAL

## 2021-03-23 PROCEDURE — 99072 ADDL SUPL MATRL&STAF TM PHE: CPT

## 2021-03-23 PROCEDURE — 99214 OFFICE O/P EST MOD 30 MIN: CPT

## 2021-03-23 NOTE — DISCUSSION/SUMMARY
[de-identified] : 50 M presenting for follow up of lateral and medial epicondylitis. Given improvement of symptoms at this time there is no need for prolo or PRP. Will continue with PT as it has given him relief. Expressed to patient that at this time he can start a gradual return to lifting. Patient to follow up PRN.

## 2021-03-23 NOTE — PHYSICAL EXAM
[de-identified] : General Appearance: Well-nourished, well developed in no acute distress\par Orientation: Oriented to person, place and time.             Mood / Affect: Calm\par Gait: normal           Coordination: normal\par Elbow Exam (Bilateral)\par Inspection/Palpation UE (R/L): No TTP over lateral or medial epicondyle b/l. \par Lateral epicondyle pain with wrist extension against resistance\par Elbow ROM (R/L): 0-130/ 0-130\par Suppination (R/L): 80/80Pronation (R/L): 80/80\par Elbow Stability (R/L): no varus or valgus laxity bilaterally\par Biceps (R/L): 5/5 / 5/5                              Triceps (R/L): 5/5 / 5/5\par Wrist Extension (R/L): 5/5 / 5/5                Wrist Flexion (R/L): 5/5 / 5/5\par Intrinsics (R/L): 5/5 / 5/5\par Sensation: Subjective normal median, ulnar, radial and axillary sensation bilaterally\par Vasculature: 2+ radial pulse bilaterally\par UE Skin (R/L): no rashes or lesions bilaterally

## 2021-03-23 NOTE — HISTORY OF PRESENT ILLNESS
[de-identified] : 50 M RHD presenting for 6 week follow up of L medial epicondylitis and R lateral epicondylitis. Patient notes marked improvement with no pain at this time. Patient has been doing PT 2-3 times per week and this has helped. Patient notes he has not done heavy lifting as instructed. No OTC pain medications needed. No numbness/tingling/weakness noted.

## 2021-07-21 ENCOUNTER — EMERGENCY (EMERGENCY)
Facility: HOSPITAL | Age: 51
LOS: 1 days | Discharge: HOME | End: 2021-07-21
Admitting: STUDENT IN AN ORGANIZED HEALTH CARE EDUCATION/TRAINING PROGRAM
Payer: COMMERCIAL

## 2021-07-21 VITALS
OXYGEN SATURATION: 98 % | SYSTOLIC BLOOD PRESSURE: 140 MMHG | RESPIRATION RATE: 18 BRPM | HEART RATE: 83 BPM | TEMPERATURE: 98 F | DIASTOLIC BLOOD PRESSURE: 78 MMHG

## 2021-07-21 PROCEDURE — 99284 EMERGENCY DEPT VISIT MOD MDM: CPT

## 2021-07-21 NOTE — ED ADULT TRIAGE NOTE - CHIEF COMPLAINT QUOTE
Pt states, "A few weeks ago I hit my head on top of the file cabinet and the bump on my head hasn't healed. I noticed some redness to the area now, I think it's an abscess." Denies PMH.

## 2021-07-22 PROCEDURE — 70450 CT HEAD/BRAIN W/O DYE: CPT | Mod: 26

## 2021-07-22 NOTE — ED PROVIDER NOTE - CLINICAL SUMMARY MEDICAL DECISION MAKING FREE TEXT BOX
49 Y/O M w/ no PMH presents to ER for head injury.   CTH r/o bleed vs fracture. Likely epidermal cyst vs hematoma  Re-evaluate. Symptom management 51 Y/O M w/ no PMH presents to ER for head injury.   CTH r/o bleed vs fracture. Likely epidermal cyst vs hematoma  Re-evaluate. Symptom management    CTH demonstrates parietal hematoma  No motor/sensory neuro deficits  Can follow up with PMD   Return precautions given

## 2021-07-22 NOTE — ED PROVIDER NOTE - PATIENT PORTAL LINK FT
You can access the FollowMyHealth Patient Portal offered by [facility] by registering at the following website: [email or web address]. By joining [facility]’s FollowMyHealth portal, you will also be able to view your health information using other applications (apps) compatible with our system.

## 2021-07-22 NOTE — ED PROVIDER NOTE - PMH
No pertinent past medical history    No pertinent past medical history    No pertinent past medical history

## 2021-07-22 NOTE — ED PROVIDER NOTE - OBJECTIVE STATEMENT
49 Y/O M w/ no PMH presents to ER for head injury. States last week accidentally hit RT side of head on wood shelf. Applied ice to affected area and took OTC medications for pain relief. Has noticed some swelling to area over past few days, tender to touch and loss of hair. UTD on tetanus. Denies fever, nausea/vomiting, dizziness, headache, visual change, neck pain.

## 2021-07-22 NOTE — ED PROVIDER NOTE - NS ED ROS FT
Constitutional: (-) fever   Head: (+) Lump to RT side of head  Eyes/ENT: (-) vision changes  Cardiovascular: (-) chest pain, (-) wheezing  Respiratory: (-) cough, (-) shortness of breath  Gastrointestinal: (-) vomiting, (-) diarrhea, (-) abdominal pain  : (-) dysuria   Musculoskeletal: (-) back pain  Integumentary: (-) rash, (-) edema  Neurological: (-)loc  Allergic/Immunologic: (-) pruritus

## 2021-07-22 NOTE — ED PROVIDER NOTE - NSFOLLOWUPINSTRUCTIONS_ED_ALL_ED_FT
1 Please follow up with primary care for further evaluation  2) Return to the ED immediately for new or worsening symptoms including headache, nausea/vomiting, dizziness, visual/hearing change.  3) Please continue to take any home medications as prescribed. Take Tylenol 325 mg every 4 hours for pain relief/fever control or ibuprofen 600 mg every 6 hours for pain relief/fever control  4) Your test results from your ED visit were discussed with you prior to discharge  5) You were provided with a copy of your test results

## 2021-07-22 NOTE — ED PROVIDER NOTE - PHYSICAL EXAMINATION
Vital signs reviewed.   CONSTITUTIONAL: Well-appearing; well-nourished; in no apparent distress. Non-toxic appearing.   HEAD: Normocephalic, atraumatic.  EYES: PERRL, EOM intact, visual fields intact, normal conjunctiva and no sclera injection noted  ENT: normal nose; no rhinorrhea  NECK: FAROM. No midline tenderness  CARD: Normal S1, S2  RESP: Normal chest excursion with respiration; breath sounds clear and equal bilaterally;  ABD/GI: soft, non-distended; non-tender; no CVA tenderness  EXT/MS: moves all extremities; distal pulses are normal, no pedal edema.  SKIN: Normal for age and race; warm; dry; good turgor; no apparent lesions or exudate noted.  NEURO: Awake, alert, oriented x 3, no gross deficits, CN II-XII grossly intact, no motor or sensory deficit noted. No pronator drift. Gait intact. Finger to nose intact.  PSYCH: Normal mood; appropriate affect.

## 2022-02-01 NOTE — ED ADULT NURSE NOTE - NS_ED_NURSE_TEACHING_TOPIC_ED_A_ED
Patient has appointment scheduled with Dr. Rogers at [facility] on 2/14/22.  Patient's appointment will be moved to [facility].  Appointment time will not change.      Called patient.  No answer.  Left message on answering machine to call back.     PSR if patient calls back please advise of above message.    Orthopedic

## 2023-02-09 NOTE — ED PROVIDER NOTE - CPE EDP HEAD NORM PED
Telemetry Bed?: Yes   Admitting Physician: YANE GALDAMEZ [[id number]]   Is this a telephone or verbal order?: This is a telephone order from the admitting physician   Head atraumatic, normal cephalic shape.

## 2024-11-26 ENCOUNTER — INPATIENT (INPATIENT)
Facility: HOSPITAL | Age: 54
LOS: 1 days | Discharge: HOME | DRG: 661 | End: 2024-11-28
Attending: STUDENT IN AN ORGANIZED HEALTH CARE EDUCATION/TRAINING PROGRAM | Admitting: STUDENT IN AN ORGANIZED HEALTH CARE EDUCATION/TRAINING PROGRAM
Payer: COMMERCIAL

## 2024-11-26 VITALS
OXYGEN SATURATION: 98 % | HEART RATE: 70 BPM | WEIGHT: 199.96 LBS | TEMPERATURE: 98 F | DIASTOLIC BLOOD PRESSURE: 79 MMHG | RESPIRATION RATE: 22 BRPM | HEIGHT: 72 IN | SYSTOLIC BLOOD PRESSURE: 154 MMHG

## 2024-11-26 LAB
ALBUMIN SERPL ELPH-MCNC: 3.7 G/DL — SIGNIFICANT CHANGE UP (ref 3.4–5)
ALP SERPL-CCNC: 85 U/L — SIGNIFICANT CHANGE UP (ref 40–120)
ALT FLD-CCNC: 34 U/L — SIGNIFICANT CHANGE UP (ref 12–42)
ANION GAP SERPL CALC-SCNC: 4 MMOL/L — LOW (ref 9–16)
ANION GAP SERPL CALC-SCNC: 6 MMOL/L — LOW (ref 9–16)
APPEARANCE UR: ABNORMAL
AST SERPL-CCNC: 29 U/L — SIGNIFICANT CHANGE UP (ref 15–37)
BASOPHILS # BLD AUTO: 0.13 K/UL — SIGNIFICANT CHANGE UP (ref 0–0.2)
BASOPHILS NFR BLD AUTO: 1 % — SIGNIFICANT CHANGE UP (ref 0–2)
BILIRUB SERPL-MCNC: 0.4 MG/DL — SIGNIFICANT CHANGE UP (ref 0.2–1.2)
BILIRUB UR-MCNC: NEGATIVE — SIGNIFICANT CHANGE UP
BUN SERPL-MCNC: 11 MG/DL — SIGNIFICANT CHANGE UP (ref 7–23)
BUN SERPL-MCNC: 14 MG/DL — SIGNIFICANT CHANGE UP (ref 7–23)
CALCIUM SERPL-MCNC: 8 MG/DL — LOW (ref 8.5–10.5)
CALCIUM SERPL-MCNC: 8.9 MG/DL — SIGNIFICANT CHANGE UP (ref 8.5–10.5)
CHLORIDE SERPL-SCNC: 107 MMOL/L — SIGNIFICANT CHANGE UP (ref 96–108)
CHLORIDE SERPL-SCNC: 111 MMOL/L — HIGH (ref 96–108)
CO2 SERPL-SCNC: 28 MMOL/L — SIGNIFICANT CHANGE UP (ref 22–31)
CO2 SERPL-SCNC: 30 MMOL/L — SIGNIFICANT CHANGE UP (ref 22–31)
COLOR SPEC: YELLOW — SIGNIFICANT CHANGE UP
CREAT SERPL-MCNC: 1.61 MG/DL — HIGH (ref 0.5–1.3)
CREAT SERPL-MCNC: 1.63 MG/DL — HIGH (ref 0.5–1.3)
DIFF PNL FLD: ABNORMAL
EGFR: 49 ML/MIN/1.73M2 — LOW
EGFR: 50 ML/MIN/1.73M2 — LOW
EOSINOPHIL # BLD AUTO: 0.52 K/UL — HIGH (ref 0–0.5)
EOSINOPHIL NFR BLD AUTO: 4 % — SIGNIFICANT CHANGE UP (ref 0–6)
GLUCOSE SERPL-MCNC: 103 MG/DL — HIGH (ref 70–99)
GLUCOSE SERPL-MCNC: 114 MG/DL — HIGH (ref 70–99)
GLUCOSE UR QL: NEGATIVE MG/DL — SIGNIFICANT CHANGE UP
HCT VFR BLD CALC: 45.5 % — SIGNIFICANT CHANGE UP (ref 39–50)
HGB BLD-MCNC: 15.3 G/DL — SIGNIFICANT CHANGE UP (ref 13–17)
KETONES UR-MCNC: ABNORMAL MG/DL
LEUKOCYTE ESTERASE UR-ACNC: ABNORMAL
LIDOCAIN IGE QN: 31 U/L — SIGNIFICANT CHANGE UP (ref 16–77)
LYMPHOCYTES # BLD AUTO: 45 % — HIGH (ref 13–44)
LYMPHOCYTES # BLD AUTO: 5.85 K/UL — HIGH (ref 1–3.3)
MCHC RBC-ENTMCNC: 28.7 PG — SIGNIFICANT CHANGE UP (ref 27–34)
MCHC RBC-ENTMCNC: 33.6 G/DL — SIGNIFICANT CHANGE UP (ref 32–36)
MCV RBC AUTO: 85.4 FL — SIGNIFICANT CHANGE UP (ref 80–100)
MONOCYTES # BLD AUTO: 0.65 K/UL — SIGNIFICANT CHANGE UP (ref 0–0.9)
MONOCYTES NFR BLD AUTO: 5 % — SIGNIFICANT CHANGE UP (ref 2–14)
NEUTROPHILS # BLD AUTO: 5.59 K/UL — SIGNIFICANT CHANGE UP (ref 1.8–7.4)
NEUTROPHILS NFR BLD AUTO: 43 % — SIGNIFICANT CHANGE UP (ref 43–77)
NITRITE UR-MCNC: NEGATIVE — SIGNIFICANT CHANGE UP
NRBC # BLD: SIGNIFICANT CHANGE UP /100 WBCS (ref 0–0)
PH UR: 7 — SIGNIFICANT CHANGE UP (ref 5–8)
PLATELET # BLD AUTO: 272 K/UL — SIGNIFICANT CHANGE UP (ref 150–400)
POTASSIUM SERPL-MCNC: 4.1 MMOL/L — SIGNIFICANT CHANGE UP (ref 3.5–5.3)
POTASSIUM SERPL-MCNC: 4.7 MMOL/L — SIGNIFICANT CHANGE UP (ref 3.5–5.3)
POTASSIUM SERPL-SCNC: 4.1 MMOL/L — SIGNIFICANT CHANGE UP (ref 3.5–5.3)
POTASSIUM SERPL-SCNC: 4.7 MMOL/L — SIGNIFICANT CHANGE UP (ref 3.5–5.3)
PROT SERPL-MCNC: 7.5 G/DL — SIGNIFICANT CHANGE UP (ref 6.4–8.2)
PROT UR-MCNC: 30 MG/DL
RBC # BLD: 5.33 M/UL — SIGNIFICANT CHANGE UP (ref 4.2–5.8)
RBC # FLD: 12.4 % — SIGNIFICANT CHANGE UP (ref 10.3–14.5)
SODIUM SERPL-SCNC: 141 MMOL/L — SIGNIFICANT CHANGE UP (ref 132–145)
SODIUM SERPL-SCNC: 145 MMOL/L — SIGNIFICANT CHANGE UP (ref 132–145)
SP GR SPEC: 1.03 — SIGNIFICANT CHANGE UP (ref 1–1.03)
UROBILINOGEN FLD QL: 1 MG/DL — SIGNIFICANT CHANGE UP (ref 0.2–1)
WBC # BLD: 12.99 K/UL — HIGH (ref 3.8–10.5)
WBC # FLD AUTO: 12.99 K/UL — HIGH (ref 3.8–10.5)

## 2024-11-26 PROCEDURE — 99223 1ST HOSP IP/OBS HIGH 75: CPT

## 2024-11-26 PROCEDURE — 74176 CT ABD & PELVIS W/O CONTRAST: CPT | Mod: 26,MC

## 2024-11-26 PROCEDURE — 99222 1ST HOSP IP/OBS MODERATE 55: CPT

## 2024-11-26 RX ORDER — SODIUM CHLORIDE 9 MG/ML
1000 INJECTION, SOLUTION INTRAMUSCULAR; INTRAVENOUS; SUBCUTANEOUS ONCE
Refills: 0 | Status: COMPLETED | OUTPATIENT
Start: 2024-11-26 | End: 2024-11-26

## 2024-11-26 RX ORDER — LIDOCAINE HCL 20 MG/ML
140 VIAL (ML) INJECTION ONCE
Refills: 0 | Status: COMPLETED | OUTPATIENT
Start: 2024-11-26 | End: 2024-11-26

## 2024-11-26 RX ORDER — SODIUM CHLORIDE 9 MG/ML
1000 INJECTION, SOLUTION INTRAMUSCULAR; INTRAVENOUS; SUBCUTANEOUS
Refills: 0 | Status: DISCONTINUED | OUTPATIENT
Start: 2024-11-26 | End: 2024-11-27

## 2024-11-26 RX ORDER — KETOROLAC TROMETHAMINE 30 MG/ML
15 INJECTION INTRAMUSCULAR; INTRAVENOUS ONCE
Refills: 0 | Status: DISCONTINUED | OUTPATIENT
Start: 2024-11-26 | End: 2024-11-26

## 2024-11-26 RX ORDER — ACETAMINOPHEN 500MG 500 MG/1
1000 TABLET, COATED ORAL ONCE
Refills: 0 | Status: COMPLETED | OUTPATIENT
Start: 2024-11-26 | End: 2024-11-26

## 2024-11-26 RX ORDER — ONDANSETRON HYDROCHLORIDE 4 MG/1
4 TABLET, FILM COATED ORAL ONCE
Refills: 0 | Status: COMPLETED | OUTPATIENT
Start: 2024-11-26 | End: 2024-11-26

## 2024-11-26 RX ADMIN — KETOROLAC TROMETHAMINE 15 MILLIGRAM(S): 30 INJECTION INTRAMUSCULAR; INTRAVENOUS at 16:32

## 2024-11-26 RX ADMIN — ONDANSETRON HYDROCHLORIDE 4 MILLIGRAM(S): 4 TABLET, FILM COATED ORAL at 16:32

## 2024-11-26 RX ADMIN — SODIUM CHLORIDE 1000 MILLILITER(S): 9 INJECTION, SOLUTION INTRAMUSCULAR; INTRAVENOUS; SUBCUTANEOUS at 16:31

## 2024-11-26 RX ADMIN — Medication 2 MILLIGRAM(S): at 22:27

## 2024-11-26 RX ADMIN — Medication 642 MILLIGRAM(S): at 19:56

## 2024-11-26 RX ADMIN — SODIUM CHLORIDE 1000 MILLILITER(S): 9 INJECTION, SOLUTION INTRAMUSCULAR; INTRAVENOUS; SUBCUTANEOUS at 19:16

## 2024-11-26 RX ADMIN — Medication 2 MILLIGRAM(S): at 19:16

## 2024-11-26 RX ADMIN — ACETAMINOPHEN 500MG 400 MILLIGRAM(S): 500 TABLET, COATED ORAL at 22:26

## 2024-11-26 RX ADMIN — Medication 4 MILLIGRAM(S): at 16:32

## 2024-11-26 RX ADMIN — SODIUM CHLORIDE 150 MILLILITER(S): 9 INJECTION, SOLUTION INTRAMUSCULAR; INTRAVENOUS; SUBCUTANEOUS at 19:36

## 2024-11-26 RX ADMIN — KETOROLAC TROMETHAMINE 15 MILLIGRAM(S): 30 INJECTION INTRAMUSCULAR; INTRAVENOUS at 19:52

## 2024-11-26 NOTE — ED PROVIDER NOTE - PHYSICAL EXAMINATION
VITAL SIGNS: I have reviewed nursing notes and confirm.  CONSTITUTIONAL: Well-developed; well-nourished; appears uncomfortable, pacing around stretcher.   SKIN: Skin is warm and dry, no acute rash.  HEAD: Normocephalic; atraumatic.  EYES: PERRL, EOM intact; conjunctiva and sclera clear.  ENT: No nasal discharge; airway clear.  NECK: Supple; non tender.  CARD: S1, S2 normal; no murmurs, gallops, or rubs. Regular rate and rhythm.  RESP: No wheezes, rales or rhonchi.  ABD: Normal bowel sounds; soft; non-distended; non-tender; no hepatosplenomegaly; no CVAT b/l.   MSK: Normal ROM. No clubbing, cyanosis or edema.  NEURO: Alert, oriented. Grossly unremarkable.  PSYCH: Cooperative, appropriate.

## 2024-11-26 NOTE — ED PROVIDER NOTE - OBJECTIVE STATEMENT
Patient is a 54-year-old male who presents with acute onset of left flank pain.  Patient denies any trauma.  Denies similar pain in past.  Pain is sharp and described as a 10 out of 10.  It radiates around flank but denies radiation to the groin.  He reports some darkening of his urine but no gross blood.  Denies any fevers or chills.  Denies any nausea or vomiting.  No other complaints.

## 2024-11-26 NOTE — ED PROVIDER NOTE - CLINICAL SUMMARY MEDICAL DECISION MAKING FREE TEXT BOX
This is a 54-year-old otherwise healthy male whose presentation is consistent for kidney stone.  IV placed and blood work sent.  Pain medication given with good response.  We will perform a CT of the abdomen pelvis without contrast to evaluate for kidney stone.  We will reevaluate.

## 2024-11-26 NOTE — ED ADULT NURSE NOTE - OBJECTIVE STATEMENT
c/o nausea and back pain x 1 day, no s/s of distress, awaiting imaging, will continue to monitor for change in assessment

## 2024-11-26 NOTE — ED PROVIDER NOTE - IV ALTEPLASE EXCL REL HIDDEN
Kalpana Dinero is a 30 year old female presenting for a sore throat that started about 2 days ago.    Denies known Latex allergy or symptoms of Latex sensitivity.  Medications reviewed and updated.  Health Maintenance Due   Topic Date Due   • Depression Screening  03/28/1998   • Cervical Cancer Screening HPV CO-Testing  03/28/2016       Patient is due for topics as listed above but is not proceeding with Immunization(s) Influenza at this time.                   show

## 2024-11-26 NOTE — ED ADULT NURSE REASSESSMENT NOTE - NS ED NURSE REASSESS COMMENT FT1
Pt c/o pain. Rpt lab sent. Pt provided w/ urine cup for pending sample. Fluids not infusing despite order, informed by MD that order was d/brennen by previous provider.

## 2024-11-26 NOTE — ED PROVIDER NOTE - PROGRESS NOTE DETAILS
Given CT read I will consult urology attending to discuss if they would like to address the possible ureteral stricture now. I spoke with Dr. Rodriguez of urology.  She is the attending on-call.  She recommends observation of the patient to evaluate for recurrence of pain.  She is concerned about the elevation in the creatinine so recommends giving additional IV fluid and then reevaluating the BMP and urinalysis.  We will place patient on observation and signed out to overnight team at 8 PM.

## 2024-11-26 NOTE — ED PROVIDER NOTE - DATE/TIME 1
Spoke with pt and gave her the message Jasper RN had sent her on [employer]. She had also sent a [employer] message about cramping and bleeding which she said this result explains that and had no further questions/concerns and did not want to speak with RN at time of call but would call back if worsening or had questions.     Sakina Flower CMA ([facility])     26-Nov-2024 18:56

## 2024-11-26 NOTE — ED CDU PROVIDER DISPOSITION NOTE - CLINICAL COURSE
Patient is a 54-year-old male who presents with acute onset of left flank pain.  Patient denies any trauma.  Denies similar pain in past.  Pain is sharp and described as a 10 out of 10.  It radiates around flank but denies radiation to the groin.  He reports some darkening of his urine but no gross blood.  Denies any fevers or chills.  Denies any nausea or vomiting.  No other complaints.  Patient with 2 mm stone.  There is concern of possible ureteral stricture.  Urology was consulted.  Recommended patient receive additional fluid resuscitation given elevated creatinine.  As well as repeat UA.  Patient is pending UA at this time.  Repeat creatinine increased to 1.63.  Patient still with persistent pain requiring more morphine.  Received a total of 3+ doses of morphine, lidocaine, 2 times Toradol, ofirmev.  Given increased pain and increased creatinine will admit.

## 2024-11-26 NOTE — ED CDU PROVIDER INITIAL DAY NOTE - DETAILS
Patient on observation due to kidney stone with possible ureteral stricture.  We need to ensure patient's kidney function improves.  We will also need to ensure that his pain is under control.  We will give IV hydration and recheck BMP and urinalysis and follow-up with Dr. Rodriguez of urology.

## 2024-11-26 NOTE — ED CDU PROVIDER DISPOSITION NOTE - CADM POA CENTRAL LINE
Anesthesia Evaluation     Patient summary reviewed and Nursing notes reviewed   no history of anesthetic complications:  NPO Solid Status: > 8 hours  NPO Liquid Status: > 4 hours           Airway   Mallampati: II  TM distance: >3 FB  Neck ROM: full  No difficulty expected  Dental          Pulmonary - negative pulmonary ROS and normal exam    breath sounds clear to auscultation  Cardiovascular - normal exam  Exercise tolerance: poor (<4 METS)    Patient on routine beta blocker and Beta blocker given within 24 hours of surgery  Rhythm: regular  Rate: normal    (+) hypertension well controlled,     ROS comment: Activity reduced d/t pain knee      Neuro/Psych  (+) psychiatric history Anxiety,     GI/Hepatic/Renal/Endo    (+) obesity,  GERD well controlled,      Musculoskeletal     Abdominal  - normal exam   Substance History - negative use     OB/GYN          Other   (+) arthritis (??RA fingers and feet)     ROS/Med Hx Other: gatorade at 0200                    Anesthesia Plan    ASA 2     regional, spinal and general   (ACB)  intravenous induction   Anesthetic plan, all risks, benefits, and alternatives have been provided, discussed and informed consent has been obtained with: patient.  Use of blood products discussed with patient  Consented to blood products.     
No

## 2024-11-26 NOTE — ED ADULT TRIAGE NOTE - CHIEF COMPLAINT QUOTE
Pt walk in c/o L low back pain x today. Acute onset. Associated with NV. Denie h/o Kidney stones. Appears miserable.

## 2024-11-27 ENCOUNTER — TRANSCRIPTION ENCOUNTER (OUTPATIENT)
Age: 54
End: 2024-11-27

## 2024-11-27 LAB
ANION GAP SERPL CALC-SCNC: 8 MMOL/L — SIGNIFICANT CHANGE UP (ref 5–17)
APPEARANCE UR: CLEAR — SIGNIFICANT CHANGE UP
APPEARANCE UR: CLEAR — SIGNIFICANT CHANGE UP
APTT BLD: 27.8 SEC — SIGNIFICANT CHANGE UP (ref 24.5–35.6)
BILIRUB UR-MCNC: NEGATIVE — SIGNIFICANT CHANGE UP
BILIRUB UR-MCNC: NEGATIVE — SIGNIFICANT CHANGE UP
BLD GP AB SCN SERPL QL: NEGATIVE — SIGNIFICANT CHANGE UP
BUN SERPL-MCNC: 16 MG/DL — SIGNIFICANT CHANGE UP (ref 7–23)
CALCIUM SERPL-MCNC: 7.7 MG/DL — LOW (ref 8.4–10.5)
CHLORIDE SERPL-SCNC: 109 MMOL/L — HIGH (ref 96–108)
CO2 SERPL-SCNC: 23 MMOL/L — SIGNIFICANT CHANGE UP (ref 22–31)
COLOR SPEC: YELLOW — SIGNIFICANT CHANGE UP
COLOR SPEC: YELLOW — SIGNIFICANT CHANGE UP
CREAT SERPL-MCNC: 1.54 MG/DL — HIGH (ref 0.5–1.3)
DIFF PNL FLD: ABNORMAL
DIFF PNL FLD: ABNORMAL
EGFR: 53 ML/MIN/1.73M2 — LOW
GLUCOSE SERPL-MCNC: 104 MG/DL — HIGH (ref 70–99)
GLUCOSE UR QL: NEGATIVE MG/DL — SIGNIFICANT CHANGE UP
GLUCOSE UR QL: NEGATIVE MG/DL — SIGNIFICANT CHANGE UP
HCT VFR BLD CALC: 40.8 % — SIGNIFICANT CHANGE UP (ref 39–50)
HGB BLD-MCNC: 13.5 G/DL — SIGNIFICANT CHANGE UP (ref 13–17)
INR BLD: 0.94 — SIGNIFICANT CHANGE UP (ref 0.85–1.16)
KETONES UR-MCNC: ABNORMAL MG/DL
KETONES UR-MCNC: ABNORMAL MG/DL
LEUKOCYTE ESTERASE UR-ACNC: NEGATIVE — SIGNIFICANT CHANGE UP
LEUKOCYTE ESTERASE UR-ACNC: NEGATIVE — SIGNIFICANT CHANGE UP
MCHC RBC-ENTMCNC: 28.9 PG — SIGNIFICANT CHANGE UP (ref 27–34)
MCHC RBC-ENTMCNC: 33.1 G/DL — SIGNIFICANT CHANGE UP (ref 32–36)
MCV RBC AUTO: 87.4 FL — SIGNIFICANT CHANGE UP (ref 80–100)
NITRITE UR-MCNC: NEGATIVE — SIGNIFICANT CHANGE UP
NITRITE UR-MCNC: NEGATIVE — SIGNIFICANT CHANGE UP
NRBC # BLD: 0 /100 WBCS — SIGNIFICANT CHANGE UP (ref 0–0)
PH UR: 5.5 — SIGNIFICANT CHANGE UP (ref 5–8)
PH UR: 6.5 — SIGNIFICANT CHANGE UP (ref 5–8)
PLATELET # BLD AUTO: 192 K/UL — SIGNIFICANT CHANGE UP (ref 150–400)
POTASSIUM SERPL-MCNC: 4 MMOL/L — SIGNIFICANT CHANGE UP (ref 3.5–5.3)
POTASSIUM SERPL-SCNC: 4 MMOL/L — SIGNIFICANT CHANGE UP (ref 3.5–5.3)
PROT UR-MCNC: NEGATIVE MG/DL — SIGNIFICANT CHANGE UP
PROT UR-MCNC: SIGNIFICANT CHANGE UP MG/DL
PROTHROM AB SERPL-ACNC: 11 SEC — SIGNIFICANT CHANGE UP (ref 9.9–13.4)
RBC # BLD: 4.67 M/UL — SIGNIFICANT CHANGE UP (ref 4.2–5.8)
RBC # FLD: 12.6 % — SIGNIFICANT CHANGE UP (ref 10.3–14.5)
RH IG SCN BLD-IMP: POSITIVE — SIGNIFICANT CHANGE UP
SODIUM SERPL-SCNC: 140 MMOL/L — SIGNIFICANT CHANGE UP (ref 135–145)
SP GR SPEC: 1.02 — SIGNIFICANT CHANGE UP (ref 1–1.03)
SP GR SPEC: >1.03 — HIGH (ref 1–1.03)
UROBILINOGEN FLD QL: 0.2 MG/DL — SIGNIFICANT CHANGE UP (ref 0.2–1)
UROBILINOGEN FLD QL: 0.2 MG/DL — SIGNIFICANT CHANGE UP (ref 0.2–1)
WBC # BLD: 8.05 K/UL — SIGNIFICANT CHANGE UP (ref 3.8–10.5)
WBC # FLD AUTO: 8.05 K/UL — SIGNIFICANT CHANGE UP (ref 3.8–10.5)

## 2024-11-27 PROCEDURE — 52332 CYSTOSCOPY AND TREATMENT: CPT | Mod: LT

## 2024-11-27 PROCEDURE — 74420 UROGRAPHY RTRGR +-KUB: CPT | Mod: 26

## 2024-11-27 PROCEDURE — 71045 X-RAY EXAM CHEST 1 VIEW: CPT | Mod: 26

## 2024-11-27 DEVICE — GUIDEWIRE AMPLATZ SUPER-STIFF .038" X 145CM 3.5CM FLEXIBLE: Type: IMPLANTABLE DEVICE | Site: LEFT | Status: FUNCTIONAL

## 2024-11-27 DEVICE — URETERAL CATH OPEN END FLEXI-TIP 5FR .038" X 70CM: Type: IMPLANTABLE DEVICE | Site: LEFT | Status: FUNCTIONAL

## 2024-11-27 DEVICE — URETERAL STENT TRIA SOFT 6FR 26MM: Type: IMPLANTABLE DEVICE | Site: LEFT | Status: FUNCTIONAL

## 2024-11-27 DEVICE — GUIDEWIRE SENSOR DUAL-FLEX NITINOL STRAIGHT .035" X 150CM: Type: IMPLANTABLE DEVICE | Site: LEFT | Status: FUNCTIONAL

## 2024-11-27 DEVICE — GUIDEWIRE SENSOR DUAL-FLEX NITINOL STRAIGHT .038" X 150CM: Type: IMPLANTABLE DEVICE | Site: LEFT | Status: FUNCTIONAL

## 2024-11-27 DEVICE — URETERAL CATH DUAL LUMEN 10FR 54CM: Type: IMPLANTABLE DEVICE | Site: LEFT | Status: FUNCTIONAL

## 2024-11-27 RX ORDER — OXYCODONE HYDROCHLORIDE 30 MG/1
5 TABLET ORAL ONCE
Refills: 0 | Status: DISCONTINUED | OUTPATIENT
Start: 2024-11-27 | End: 2024-11-28

## 2024-11-27 RX ORDER — PHENAZOPYRIDINE HCL 200 MG
100 TABLET ORAL ONCE
Refills: 0 | Status: DISCONTINUED | OUTPATIENT
Start: 2024-11-27 | End: 2024-11-28

## 2024-11-27 RX ORDER — 0.9 % SODIUM CHLORIDE 0.9 %
1000 INTRAVENOUS SOLUTION INTRAVENOUS
Refills: 0 | Status: DISCONTINUED | OUTPATIENT
Start: 2024-11-27 | End: 2024-11-27

## 2024-11-27 RX ORDER — SODIUM CHLORIDE 9 MG/ML
1000 INJECTION, SOLUTION INTRAMUSCULAR; INTRAVENOUS; SUBCUTANEOUS
Refills: 0 | Status: DISCONTINUED | OUTPATIENT
Start: 2024-11-27 | End: 2024-11-28

## 2024-11-27 RX ORDER — TAMSULOSIN HYDROCHLORIDE 0.4 MG/1
1 CAPSULE ORAL
Qty: 10 | Refills: 0
Start: 2024-11-27 | End: 2024-12-06

## 2024-11-27 RX ORDER — OXYBUTYNIN CHLORIDE 5 MG
5 TABLET ORAL ONCE
Refills: 0 | Status: COMPLETED | OUTPATIENT
Start: 2024-11-27 | End: 2024-11-27

## 2024-11-27 RX ORDER — ACETAMINOPHEN 500MG 500 MG/1
650 TABLET, COATED ORAL EVERY 6 HOURS
Refills: 0 | Status: DISCONTINUED | OUTPATIENT
Start: 2024-11-27 | End: 2024-11-28

## 2024-11-27 RX ORDER — ONDANSETRON HYDROCHLORIDE 4 MG/1
4 TABLET, FILM COATED ORAL EVERY 8 HOURS
Refills: 0 | Status: DISCONTINUED | OUTPATIENT
Start: 2024-11-27 | End: 2024-11-28

## 2024-11-27 RX ORDER — INFLUENZA VIRUS VACCINE 15; 15; 15; 15 UG/.5ML; UG/.5ML; UG/.5ML; UG/.5ML
0.5 SUSPENSION INTRAMUSCULAR ONCE
Refills: 0 | Status: DISCONTINUED | OUTPATIENT
Start: 2024-11-27 | End: 2024-11-28

## 2024-11-27 RX ORDER — TAMSULOSIN HYDROCHLORIDE 0.4 MG/1
0.4 CAPSULE ORAL AT BEDTIME
Refills: 0 | Status: DISCONTINUED | OUTPATIENT
Start: 2024-11-27 | End: 2024-11-28

## 2024-11-27 RX ORDER — PHENAZOPYRIDINE HCL 200 MG
1 TABLET ORAL
Qty: 9 | Refills: 0
Start: 2024-11-27 | End: 2024-11-29

## 2024-11-27 RX ADMIN — Medication 2 MILLIGRAM(S): at 17:32

## 2024-11-27 RX ADMIN — TAMSULOSIN HYDROCHLORIDE 0.4 MILLIGRAM(S): 0.4 CAPSULE ORAL at 21:10

## 2024-11-27 RX ADMIN — Medication 2 MILLIGRAM(S): at 14:10

## 2024-11-27 RX ADMIN — SODIUM CHLORIDE 120 MILLILITER(S): 9 INJECTION, SOLUTION INTRAMUSCULAR; INTRAVENOUS; SUBCUTANEOUS at 15:28

## 2024-11-27 RX ADMIN — Medication 2 MILLIGRAM(S): at 10:27

## 2024-11-27 RX ADMIN — Medication 5 MILLIGRAM(S): at 22:33

## 2024-11-27 RX ADMIN — SODIUM CHLORIDE 120 MILLILITER(S): 9 INJECTION, SOLUTION INTRAMUSCULAR; INTRAVENOUS; SUBCUTANEOUS at 06:59

## 2024-11-27 RX ADMIN — Medication 2 MILLIGRAM(S): at 10:44

## 2024-11-27 RX ADMIN — Medication 10 MILLIGRAM(S): at 21:10

## 2024-11-27 RX ADMIN — Medication 2 MILLIGRAM(S): at 17:17

## 2024-11-27 RX ADMIN — SODIUM CHLORIDE 150 MILLILITER(S): 9 INJECTION, SOLUTION INTRAMUSCULAR; INTRAVENOUS; SUBCUTANEOUS at 00:41

## 2024-11-27 RX ADMIN — Medication 2 MILLIGRAM(S): at 13:55

## 2024-11-27 NOTE — H&P ADULT - NSHPPHYSICALEXAM_GEN_ALL_CORE
GEN: NAD, looks comfortable  Psych: Mood appropriate  Neuro: A&Ox3.  No focal deficits.  Moving all extremities.   HEENT: No obvious abnormalities  CV: S1S2, regular, no murmurs appreciated.  No carotid bruits.  No JVD  Lungs: Clear B/L.  No wheezing, rales or rhonchi  ABD: +L CVAT, + LLQ tenderness, minimal guarding; Soft, non-distended.  +Bowel sounds  EXT: Warm and well perfused.  No peripheral edema noted  Musculoskeletal: Moving all extremities with normal ROM, no joint swelling  PV: Pedal pulses palpable

## 2024-11-27 NOTE — H&P ADULT - ASSESSMENT
54 year old male with HLD who presented to [facility] with acute onset left flank pain. Patient denies any traum. Denies similar pain in the past. He describes th4e4 pain as sharp, 10/10, radiating from the back to around the flank. Denies radiation to the groin. Denies urinary symptoms. Denies fevers, chills, n/v/c/d. No other complaints. Patient had a CT abdomen and pelvis revealing moderate left hydroureteronephrosis with a 2mm stone in the proximal left ureter with pernephric fluid. The patient's Cr increased, despite being given IV fluids. He was transferred to [facility] for further workup of the left renal stone and c/f ureteral stricture.    Problem 1: Left renal stone  Plan: observation, pain control  -CT showing 2mm Left renal stone  -c/f stricture  -f/u infectious w/u: Bcx, Ucx, vitals, etc  -monitor urinary symptoms and UOP      Problem 2: PATRICE  Plan: IVF  -despite IVF resuscitation, Cr increased at [facility]  -c/w IVF  -trend Cr

## 2024-11-27 NOTE — H&P ADULT - NSHPLABSRESULTS_GEN_ALL_CORE
< from: CT Abdomen and Pelvis No Cont (11.26.24 @ 17:18) >  Moderate left hydroureteronephrosis with 2 mm stone in the proximal left   ureter. There is left perinephric fluid. Please note that the appearance   is somewhat unusual for such a tiny stone to result in this degree of   hydroureteronephrosis raising the possibility of an underlying ureteral   stricture in this region. Recommend follow-up urological consultation   with CT urogram as an outpatient.    < end of copied text >

## 2024-11-27 NOTE — H&P ADULT - HISTORY OF PRESENT ILLNESS
54 year old male with HLD who presented to [facility] with acute onset left flank pain. Patient denies any traum. Denies similar pain in the past. He describes th4e4 pain as sharp, 10/10, radiating from the back to around the flank. Denies radiation to the groin. Denies urinary symptoms. Denies fevers, chills, n/v/c/d. No other complaints. Patient had a CT abdomen and pelvis revealing moderate left hydroureteronephrosis with a 2mm stone in the proximal left ureter with pernephric fluid. The patient's Cr increased, despite being given IV fluids. He was transferred to [facility] for further workup of the left renal stone and c/f ureteral stricture.

## 2024-11-27 NOTE — H&P ADULT - NSHPREVIEWOFSYSTEMS_GEN_ALL_CORE
Review of Systems  CONSTITUTIONAL:  Denies Fevers / chills, sweats, fatigue, weight loss, weight gain                                      NEURO:  Denies parathesias, seizures, syncope, confusion                                                                                EYES:  Denies Blurry vision, discharge, pain, loss of vision                                                                                    ENMT:  Denies Difficulty hearing, vertigo, dysphagia, epistaxis, recent dental work                                       CV:  Denies Chest pain, palpitations, VERDIN, orthopnea                                                                                          RESPIRATORY:  Denies Wheezing, SOB, cough / sputum, hemoptysis                                                                GI:  Denies Nausea, vommiting, diarrhea, constipation, melena, difficulty swallowing                                               : +left back, flank and LLQ pain radiating from back around the front; Denies Hematuria, dysuria, urgency, incontinence                                                    MUSKULOSKELETAL:  Denies arthritis, joint swelling, muscle weakness                                                             SKIN/BREAST:  Denies rash, itching, shira loss, masses                                                                                            PSYCH:  Denies depresion, anxiety, suicidal ideation                                                                                               HEME/LYMPH:  Denies bruises easily, enlarged lymph nodes, tender lymph nodes                                        ENDOCRINE:  Denies cold intolerance, heat intolerance, polydipsia

## 2024-11-27 NOTE — H&P ADULT - NSICDXFAMHXNEG_GEN_ALL
Left message for pt to call back- message was already sent to provider requesting her to send in prescriptions to pharmacy.    NA

## 2024-11-27 NOTE — PATIENT PROFILE ADULT - FALL HARM RISK - HARM RISK INTERVENTIONS

## 2024-11-27 NOTE — PATIENT PROFILE ADULT - NSPROGENOTHERPROVIDER_GEN_A_NUR
[facility] INPATIENT ENCOUNTER  PHYSICAL MEDICINE AND REHABILITATION  DAILY PROGRESS NOTE    ADMISSION DATE:  3/14/2024  DATE:  4/5/2024  CURRENT HOSPITAL DAY:  Hospital Day: 23  ATTENDING PHYSICIAN:  Margaret Danielson MD  CODE STATUS:  Full Resuscitation    CHIEF COMPLAINT:  S/P laminectomy with spinal fusion    HISTORY:  Trinh Vlilarreal is a 64 year old female patient admitted with Thoracic disc herniation [M51.24] past medical history of CKD, essential hypertension, GERD, osteoporosis, sleep apnea, history of endometrial and uterine cancer, history of cauda equina syndrome and paraplegia with neurogenic bowel and bladder , history of L1-S1 posterior spinal instrumented fusion and laminectomy and resection of tumor, history of adjuvant radiation and chemotherapy who presented to [facility] 2/21/2024 with severe pain in right lower extremity unable to move her leg, workup revealed large right T12-L1 disc herniation, admitted for planned lumbar laminectomy 2/21: T12-L1, complete right T12-L1 facetectomy, transpedicular right-sided approach discectomy T12-L1; revision posterior instrumentation with disconnection of emerson at L2, removal of screws at L1-2; posterior spinal instrumented fusion T9-L2, rods x 2; microscope microdissection: Exploration of previous fusion; repair CSF leak.  Interventions performed by Dr. Yannick Herr and Dr. Lorenzo Rudolph.   Transfer to acute inpatient rehab on 2/27 but acutely transferred to the medical floor on 3/6 for surgical intervention acute cholecystitis and underwent laparoscopic cholecystectomy on 3/6/2024 by Dr. Nabil Nieto      Course complicated by urinary retention and SIADH induced hyponatremia        INTERVAL HISTORY:    4/5 Pt endorses feeling better this am,slept last night. Talked about endocrinology consult, adrenal insufficiency treatment and follow up after discharge. Appreciate APS recs. Pt still declining mini enemas, had a small bm  this am. Encourage to use washroom/toilet regularly. Per Dr Rudolph no additional spine/neuro sx imaging needed prior to pt discharge .    Attending addendum: Discussed with patient that she does not want Enemeez would need to take MiraLAX regularly and toilet on the toilet increase her activity, use gastrocolic reflex to toilet after meals all in order to have successful BM.  Discussed that on night prior to flight and morning of flight not to take MiraLAX in order to not have an accident during flight.  Patient states she is more comfortable with Orosco on discharge given logistics of staying in hotel for night and the flight the next day.  Discussed with patient that she will need to follow-up with her PCP for urology referral on arrival in [place].  Patient does state that she is constantly voiding in the toilet, still with some leakage in between though.  PVRs are improving.  Would like to continue to work on better control while she is here.  Will insert Orosco prior to discharge though.  Discussed hemoglobin level which is slightly lower today, fecal occult blood yesterday was negative.  Continue to monitor for bleeding.  Feeling better with steroids sodium still borderline low 133.  Endocrinology team to reach out to patient's daughter to discuss new diagnosis and treatment.    4/4   Resting comfortably in bed endorses that she was able to void this morning postvoid residual of 245, has x 1 small loose BM this morning.  Patient continued to decline mini enema, patient strongly encouraged to use mini enema for consistent and adequate bowel movements. Miralax given in early am.  Missed evening dose of miralax - added scheduled dose.   She was started on hydrocortisone taper [place] [facility]  Seen practicing toileting with OT - encouraged to continue to toilet on commode/in bathroom rather than on bedpan to encourage improved UOP.      4/3/24   Seen and examined this morning she is resting comfortably in bed.   Complains of not having a bowel movement today requesting to have mini enema administered this morning.  Pt declined mini enema, pt encourage to get up when trying to have a bowel movement, can use yobany ferrari to help w/ transfers. Also complained of urinary retention with , was straight cath for 500 mL. Talked about having regular bm could help avoid urinary retention.   She denies headache, chest pain and shortness of breath.  Discussed at length with patient toileting in the bathroom and being upright more.  Patient spends significant amounts of time in bed.  Discussed how this is holding patient back from improving her bowel and bladder and that mobilizing will help her accomplish her goal of functionally preparing herself for her trip back home to [place].  Will move MiraLAX to earlier a.m. dosing in order to have bowel movement earlier in the day.    4/3/24: TEAM CONFERENCE  2 times a day oxy, scheduled   BP 95 to 80s  Back incision open to air  Retaining urine x 1  OT: eat indd, OH setup, bath mod/min, UBD setup, LBD min , socks/shoe CGA, toilet max , TTF CGA, shower tf pending  PT: sup to sit SBA, sit to sup min, WC to mat CGA w RW, amb CGA/min 14 ft RW, 1 small step pending  CM: spoke with daughter Susan maher at 9am  DC Thursday for 9am Friday flight  Plan for pickup from hot to airport   on Thursday 4/11 Friday 4/12 flight    04/02/2024 -morning cortisol level is pending.  The patient had no complaints this morning.  Potassium level was 4.2 this morning.  Sodium level is 133.        04/01/2024 -the patient had no complaints of shortness of breath or chest pain.  Patient's sodium is low and potassium is high.  The patient is on Megace.  I sent a message to the pharmacy and Dr. Elvira Turner to see if adrenal suppression is possible due to Megace.  The patient does have history of SIADH per chart.  The patient is on sodium supplements.    Straight Cath  Catheter Size (fr): 16 fr (04/03/24  none 0815)  Reason for Insertion: Urinary retention (04/03/24 0815)  Inserted By: Clinician (04/03/24 0815)  Output (mL): 500 mL (04/03/24 0815)  Bladder Scan  Reason for Scan: Post void evaluation (04/05/24 0550)  Bladder Scanned Volume (mL): 0 mL (04/05/24 0550)          3/30 patient seen and examined sitting up in a chair. patient had an episode of loose stool this morning.  Patient is voiding PVRs  6, 120, 348.  Per patient she is incontinent.  Patient's affect continued to be flat    3/29: Patient seen and examined in a.m. in room.  States that is quite prolonged to have a bowel movement this morning.  Discussed different baseline status that patient can use in order to have quickly and also encourage patient to use enemeez.  Patient is voiding with low PVRs but discussed that if she cannot stay continent between her timed voids may need to replace the Orosco.    3/27: TEAM CONFERENCE  RN: PVR retaining - straight cath x 1 last night. Placed flomax.   No accidents regularly with BM  Back incision - open to air  Pain ongoing increased tizanidine  OT: eating OH grooming setup, bathe mod - still declining out of bed, UBD setup, LBD min, FW CGA, all else dep  PT: mod/min Bed mobility, still anxious d/t fall, min A sit to stand, bed to chair w RW, 25 ft RW min A + WC follow   4/11 DC    3/21: TEAM CONFERENCE  RN: bowel routine - no result, but +BM after breakfast  PT: not out of bed, sat on edge of bed, pain is severe, anxious?   Mod A bed mob, mod A sit to stand, bed to chair w RW, amb 20 ft w solostep, mod A  OT: eat groom OH setup, bathing in bed max, UBD max, LBD max in bed, mod dep  Otilio to address anxiety  CM: TDD 4/5  Goal for daughter to  and take home, spouse to assist at home  Pre-op stayed with daughter's friend?   TDD 4/5, difficult d/t flight home, reevaluate if continues to have slow progress      3/15 TEAM CONFERENCE  Brace out of bed  OT: OH , grooming setup, eating setup  PT: mod A bed mob, mod A  tf w RW (CARLI brody), 14 ft RW  follow (dep amb)  1 step to home in [place]  CM: lives in [place]. Family member will fly in and take patient back to [place] for DC   will be in [place] for her  TDD 4/5        Straight Cath  Catheter Size (fr): 16 fr (04/03/24 0815)  Reason for Insertion: Urinary retention (04/03/24 0815)  Inserted By: Clinician (04/03/24 0815)  Output (mL): 500 mL (04/03/24 0815)  Bladder Scan  Reason for Scan: Post void evaluation (04/05/24 0550)  Bladder Scanned Volume (mL): 0 mL (04/05/24 0550)       MEDICATIONS:    Current Facility-Administered Medications   Medication Dose Route Frequency Provider Last Rate Last Admin    hydroCORTisone (CORTEF) tablet 20 mg  20 mg Oral Daily with breakfast ButlerMina jackson A, DO   20 mg at 04/05/24 0855    [START ON 4/12/2024] hydroCORTisone (CORTEF) tablet 10 mg  10 mg Oral Daily with breakfast Mina Butler A, DO        [START ON 4/12/2024] hydroCORTisone (CORTEF) tablet 5 mg  5 mg Oral Daily with dinner Carrie Irfan A, DO        polyethylene glycol (MIRALAX) packet 17 g  17 g Oral BID Margaret Danielson MD   17 g at 04/05/24 0555    hydroCORTisone (CORTEF) tablet 10 mg  10 mg Oral Daily with dinner Milind Butleran A, DO   10 mg at 04/04/24 1717    pantoprazole (PROTONIX) EC tablet 40 mg  40 mg Oral QAM AC Margaret Danielson MD   40 mg at 04/05/24 0554    sodium chloride 0.9 % injection 2 mL  2 mL Intracatheter 2 times per day Mina Butler A, DO   2 mL at 04/05/24 0902    tamsulosin (FLOMAX) capsule 0.4 mg  0.4 mg Oral Daily PC Tsering Combs PA   0.4 mg at 04/05/24 0855    sodium chloride tablet 1,000 mg  1 g Oral TID WC Margaret Danielson MD   1,000 mg at 04/05/24 0856    tiZANidine (ZANAFLEX) tablet 4 mg  4 mg Oral 3 times per day Donita Montejo CNS   4 mg at 04/05/24 0600    docusate sodium (ENEMEEZ MINI) 283 MG rectal enema 283 mg  283 mg Rectal Daily Margaret Danielson MD        lidocaine (LIDOCARE) 4 % patch 3 patch  3 patch Transdermal Daily Margaret Danielson MD   3 patch at  04/05/24 0902    Magnesium Standard Replacement Protocol   Does not apply See Admin Instructions Elvira Turner MD        Phosphorus Standard Replacement Protocol   Does not apply See Admin Instructions Elvira Turnre MD        Potassium Standard Replacement Protocol (Levels 3.5 and lower)   Does not apply See Admin Instructions Elvira Turner MD        acetaminophen (TYLENOL) tablet 650 mg  650 mg Oral 4 times per day Elvira Turner MD   650 mg at 04/05/24 0553    [Held by provider] amLODIPine (NORVASC) tablet 2.5 mg  2.5 mg Oral Nightly Elvira Turner MD   2.5 mg at 04/03/24 2000    fluticasone (FLONASE) 50 MCG/ACT nasal spray 1 spray  1 spray Each Nare Daily Elvira Turner MD   1 spray at 04/05/24 0901    megestrol (MEGACE) tablet 80 mg  80 mg Oral 2 times per day Elvira Turner MD   80 mg at 04/05/24 0855    traMADol (ULTRAM) tablet 50 mg  50 mg Oral 4 times per day Elvira Turner MD   50 mg at 04/05/24 0554    enoxaparin (LOVENOX) injection 40 mg  40 mg Subcutaneous Q24H Elvira Turner MD   40 mg at 04/05/24 0902    nystatin (MYCOSTATIN) powder   Topical 2 times per day Elvira Turner MD   Given at 04/05/24 0903           HISTORIES:     I have reviewed the past medical history, family history, social history, medications and allergies listed in the medical record as obtained by my nursing staff and support staff and agree with their documentation.  ALLERGIES:   Allergen Reactions    Gabapentin SWELLING    Latex Other (See Comments)     Unknown    No Name Available Other (See Comments)     No Known Drug Allergies    Skin Adhesives RASH         REVIEW OF SYSTEMS:  See Interval History Above      OBJECTIVE:    VITAL SIGNS:     Vital Last Value 24 Hour Range   Temperature 97.3 °F (36.3 °C) (04/05/24 0521) Temp  Min: 97.3 °F (36.3 °C)  Max: 98.1 °F (36.7 °C)   Pulse 98 (04/05/24 0905) Pulse  Min: 91  Max: 98   Respiratory 16 (04/05/24 0919) Resp  Min: 16  Max: 18   Non-Invasive  Blood Pressure 103/65  (04/05/24 0905) BP  Min: 103/65  Max: 133/76   Pulse Oximetry 96 % (04/05/24 0905) SpO2  Min: 96 %  Max: 98 %     Vital Today Admitted   Weight 69.7 kg (153 lb 10.6 oz) (04/04/24 0621) Weight: 75.5 kg (166 lb 7.2 oz) (03/15/24 0200)       INTAKE/OUTPUT:      Intake/Output Summary (Last 24 hours) at 4/5/2024 0930  Last data filed at 4/4/2024 1843  Gross per 24 hour   Intake 480 ml   Output --   Net 480 ml         Bowel: Stool Amount: Small (04/05/24 0550)  Unmeasured Stool Occurrence: 1 (cont in toilet) (04/05/24 0550)     PVR: Straight Cath  Catheter Size (fr): 16 fr (04/03/24 0815)  Reason for Insertion: Urinary retention (04/03/24 0815)  Inserted By: Clinician (04/03/24 0815)  Output (mL): 500 mL (04/03/24 0815)  Bladder Scan  Reason for Scan: Post void evaluation (04/05/24 0550)  Bladder Scanned Volume (mL): 0 mL (04/05/24 0550)    PHYSICAL EXAMINATION:  Physical Exam  Constitutional:       Appearance: She is obese.   Eyes:      Conjunctiva/sclera: Conjunctivae normal.   Cardiovascular:      Rate and Rhythm: Normal rate and regular rhythm.   Pulmonary:      Effort: Pulmonary effort is normal. No respiratory distress.   Abdominal:      General: Bowel sounds are normal.      Tenderness: There is no abdominal tenderness.   Musculoskeletal:         General: Swelling present.   Skin:     General: Skin is warm and dry.      Coloration: Skin is pale.   Neurological:      Mental Status: She is alert and oriented to person, place, and time.      Comments:  Bilateral upper extremities with normal strength  Left hip 2/5  Left DF 2/5  Left PF 3/5  Right hip 2/5  Right DF/PF 3+/5   Psychiatric:         Mood and Affect: Mood normal.       3/29: Patient is alert and lying down after being upright for 2 and half hours.  Breathing comfortably RA  Patient is wearing brace still when out of bed - in bed today on assessment and no brace present  Still tearful at times when discussing bowel routine    LABORATORY DATA:    Recent Labs    Lab 04/05/24  0553 04/03/24  0534 04/01/24  0641   WBC 8.0 7.4 6.4   RBC 3.11* 3.22* 3.12*   HGB 8.2* 8.6* 8.5*   HCT 25.7* 26.8* 26.5*   MCV 82.6 83.2 84.9   MCH 26.4 26.7 27.2   MCHC 31.9* 32.1 32.1   RDWCV 15.3* 15.2* 15.0   * 528* 488*         Recent Labs   Lab 04/05/24  0553 04/03/24  0534 04/02/24  0540   SODIUM 133* 134* 133*   CHLORIDE 103 104 102   BUN 25* 21* 20   POTASSIUM 4.1 4.6 4.5   GLUCOSE 109* 96 104*   CREATININE 0.89 0.91 0.91   CALCIUM 9.6 9.6 9.7         No results found    No results found    IMAGING STUDIES:   MRI LUMBAR SPINE WO CONTRAST   Final Result   1. Postoperative changes of multilevel posterior paired pedicle fusion with   multilevel decompressive laminectomies. Persistent but smaller benign   extradural chronic seromas are also noted.      2. Multilevel disc degeneration disc bulging.      3. Findings consistent with arachnoiditis.      4. Post therapeutic fatty marrow degeneration throughout the spine with a   heterogeneous marrow pattern throughout the vertebral bodies and pelvis.   New abnormal low T1 marrow signal is present. A marrow infiltrative process   cannot be excluded      Electronically Signed by: MALENA CHAVEZ M.D.    Signed on: 3/21/2024 7:57 AM    Workstation ID: [id number]      MRI THORACIC SPINE WO CONTRAST   Final Result   1. New distal thoracic posterior fusion with multilevel disc degeneration   but no acute abnormality.      Electronically Signed by: MALENA CHAVEZ M.D.    Signed on: 3/21/2024 8:00 AM    Workstation ID: [id number]      CT THORACIC SPINE WO CONTRAST   Final Result      No acute abnormality. Recent posterior spinal fusion from T9-T11. Prior   lumbar fusion is stable.            Electronically Signed by: MALIK CUBA M.D.    Signed on: 3/19/2024 7:12 PM    Workstation ID: [id number]      CT LUMBAR SPINE WO CONTRAST   Final Result      No acute abnormality. Recent posterior spinal fusion from T9-T11. Prior   lumbar fusion  is stable.            Electronically Signed by: MALIK CUBA M.D.    Signed on: 3/19/2024 7:12 PM    Workstation ID: [id number]            ASSESSMENT/PLAN:     * S/P laminectomy with spinal fusion  Assessment & Plan  2/21 S/p  laminectomy T12-L1, complete right T12-L1 facetectomy, transpedicular right-sided approach discectomy T12-L1  revision posterior instrumentation with disconnection of emerson at L2, removal of screws at L1-2; posterior spinal instrumented fusion T9-L2, rods x 2; microscope microdissection: Exploration of previous fusion; repair CSF leak. by Matt Herr & Klaudia.  Monitor postop wound healing  Monitor for pain control  PT/OT as tolerated  Fall precautions  3/15: Patient is approximately 3 weeks postoperative.  Discussed with Dr. Rudolph that he would be assessing incision and possibly removing staples.  Discussed with Dr. Herr that patient does not require brace and is okay to twist/bend  3/19: Patient with worsening pain in the right lower back radiating to the right thigh as well as left lower back.  Discussed with Dr. Rudolph and Dr. Herr.  They recommend to obtain CT and MRI of the lumbar and thoracic spine without contrast.  Will have pain service reevaluate patient.  3/20CT of the thoracic spine and lumbar spine no acute abnormality reviewed by neurosurgery.       MRI pending  3/21: MRI reviewed by surgical team, no acute findings, may dc staples after tomorrow  3/22: Staples removed from back incision without any issues.  3/29: For now cont dapt .  DC order for brace when out of bed.  Per surgical team patient does not require brace it is only for comfort.  Encouraging patient to engage in activity without.  Will reach out to surgical team if any further follow-up is needed prior to patient's discharge  3/30 patient sitting up in chair without brace today  4/5: Reached out to neurosurgery team and okay to discharge from their standpoint, does not require any further imaging or  follow-up while in house.    Acute cholecystitis status post laparoscopic cholecystectomy 3/6/2024   Assessment & Plan  S/p laparoscopic cholecystectomy on 3/6/2024 by Dr. Nabil Nieto.   S/p KUB on 3/7 showed moderate to marked gaseous distention of the stomach and NGT was placed-resolved  Regular diet  Monitor  3/18: Completed a course of Zosyn.  Will remove PIV  4/5: Plan to discharge next week.  Will reach out to surgical team to see if any imaging or follow-up is needed prior to discharge given patient will be leaving the state and returning to [place].    Neurogenic bowel  Assessment & Plan  Bowel regimen with every morning Enemeez (patient performs dig stim in the AM to fully evacuate) also stool softener MiraLAX in the evening and a.m.  Monitor output  3/29: Continue MiraLAX at night before bed and first thing in the a.m. and use gastrocolic reflex after breakfast and if bowel movement does not come such as this a.m. I strongly advised patient to use Enemeez.  Discussed how this can be useful as an outpatient as well in order to not spend significant portions of her day worrying about bowel movement.  3/30 had an episode of loose stool this morning  4/3 Continue bowel program.  Patient complained of not having a bowel movement this a.m. despite taking MiraLAX last night.  Patient encouraged for Enemeez to be administered this morning.  Discussed with patient that this will help her have regular bowel movements.  Move MiraLAX earlier in the morning 5 AM  4/4 declined minin enema this am, she is now taking Miralax early am. Reported to have a small loose bm this morning. Will continue to encourage mini enema adm for consistent/regulag bms.  4/5  now on BID miralax, declining mini enema.  DC Enemeez per patient request.  Highly encourage patient to toilet on the toilet in order to use gravity to better have to complete BM, toilet after meal to use gastrocolic reflex.  The night before and morning of flight,  recommended patient to skip MiraLAX and or to avoid accident.  Then recommended to patient to double her MiraLAX dose on arrival in order to have complete emptying.    Urinary retention  Assessment & Plan  Orosco in place  Baseline occasional incontinence, not on medication, does not have Orosco  Consider trial of void well with rehab  Continue to monitor output  3/19: Patient was seen by urology.  They recommended voiding trial.  Due to her worsening pain today, we will delay voiding trial.  Patient is open to voiding trial.  Also keep in mind that patient will require stop over flight home which is a 11+ hour travel day.  3/20Appreciate urology recommendation plan for voiding trial~Monday  3/26 Orosco catheter removed this morning.  Voiding trial.  Urology following.  Patient's PVRs 96, 406, 306  3/27: Patient required straight cath last night for 800 cc which was significantly high but during the day patient's PVRs have so far been in the 100s to 200s.  Patient is being found wet frequently during the day.  Continue to offer bedpan/timed voiding in order to attempt to keep patient dry.  Continue to check PVRs to ensure the patient is not retaining  3/29: PVRs are low today.  Plan for continued timed voiding given patient is found to be wet during the day and has poor sensation in control.  Discussed with patient that her goal is to be continent and keep skin dry, and patient will be at risk of skin breakdown if she is constantly wet during the day.  Alternative will be Orosco.  3/30 patient continues to have low PVRs.  6, 120, 348 but she is incontinent of bladder  4/3: Patient with elevated PVR and could not void.  Discussed importance of being upright toileting in the bathroom calling frequently to be toileted that timed manner rather than waiting for the feeling given this is inconsistent.  Patient did require straight cath, unclear if this is related to patient's immobility.  Plan to continue to work on bladder  control and voiding for the time being.  If continues to have poor control and occasional retention Orosco must be replaced.  4/4 x1 episode of incontinence this am.   Reason for Scan: Post void evaluation (04/04/24 0652)  Bladder Scanned Volume (mL): 0 mL (04/04/24 0652)     4/5 : PVR is improved.  Patient does states she is voiding continently on the toilet which is a big improvement.  Patient is still wet in between though.  Continue to train bladder and toilet upright on toilet.  Patient does states she will be more comfortable to avoid accidents after discharge and into plane travel with the Orosco.  Discussed that we will insert and patient just needs to make follow-up appointment with PCP to refer to urology to begin trial of void process in [place] on return.       Post void evaluation (04/05/24 0550)  Bladder Scanned Volume (mL): 0 mL (04/05/24 0550)   Continue to monitor UOP      Impaired mobility and activities of daily living  Assessment & Plan  PT and OT as tolerated assuming clearance for activity/therapies  Fall precautions  Monitor for change in neurologic, hemodynamic and/or functional status    Hyponatremia  Assessment & Plan  Nephrology was following for management of SIADH induced hyponatremia.    Tolvaptan are on hold.    Monitor  Recent Labs   Lab 04/05/24  0553 04/03/24  0534 04/02/24  0540 04/01/24  0641   SODIUM 133* 134* 133* 132*       3/18: Sodium today stable 134, Continue to monitor, ordered BMP for next Monday Wednesday Friday  3/20 on salt tablets current sodium is 134, discussed with internal medicine to decrease to twice daily with meals and monitor, next BMP ordered for Friday  3/22: Sodium remains the same at 134.  Continue on salt tablets.  Continue management per hospitalist. 3/25 patient with hyponatremia.  Hospitalist following.  Sodium is 132 today.  Patient's receiving salt tablets  3/27: Patient's sodium has remained low at 132.  Will increase salt tabs back to 3 times daily  with meals from 2  3/29: BMP today with improving Na 134, cont TID salt tabs  4/3 BMP improving monitor accordingly  Cortisol was low, consult endocrine  4/4 pt started on steroid Endocrinology managing  4/5: Tolerating steroids well for adrenal insufficiency.  Endocrinology is managing.  Recommended to patient endocrinologist in [place].  Patient requires referral from her PCP.  Endocrine to reach out to daughter to answer questions regarding the diagnosis and treatment.    Cauda equina syndrome (CMD)  Assessment & Plan  PT/OT  On bowel program-s4e above  Likely neurogenic bladder as well  Fall precautions  4/4 continue bowel and bladder regimen    Acute post-operative pain  Assessment & Plan  On scheduled Tramadol and Tylenol, on PRN oxycodone  Monitor pain response/behaviors closely  2/15: Patient so far today has received 2 doses of oxycodone 1 at 4 AM and 1 at 9 AM.  Discussed with patient yesterday that plan will be to taper oxycodone immediate release currently 10 mg tablets can taper to 7.5 and then 5 and can also extend time between doses for goal of returning to her home medications of tramadol and Tylenol which she took concurrently 3 times a day  3/19: I discussed with pain service to reevaluate patient.  3/20: Pain service evaluated patient, appreciate recommendations  3/21: Additional right distal thigh pain, discussed with patient that this is likely a muscle strain.  Add lidocaine patch and heat.  Patient also has additional as needed Flexeril.  3/25: Pain is better controlled today receiving oxycodone prior to therapies.  Will continue current regimen.  Patient with area of muscle soreness her back.  Will try heating pack.  Discussed with PT  3/26 patient complains of muscle knot in her upper back.  Pain service increase patient's tizanidine to 4 mg every 8 hours  3/27: Patient continues to describe multiple \"knots\" of pain.  Discussed with patient to techniques to deal with pain when she is upright  and moving, the importance of continuing to engage in therapy, the importance of finding techniques to deal with this pain while doing activity in order to prepare for being for home.  Continue pain medications as per pain team  3/30: improving pain, cont current regimen  4/3: Patient is down to 2 as needed oxycodone today.  Continue to monitor      Acute blood loss anemia  Assessment & Plan   mL from most recent surgical intervention  Monitor hemoglobin closely.    Transfuse as indicated.  Recent Labs   Lab 04/05/24  0553 04/03/24  0534 04/01/24  0641   HGB 8.2* 8.6* 8.5*       3/27: Hgb improving, monitoring post operative  3/28: Order CBC tomorrow to monitor hemoglobin  3/29: Slight dip in hemoglobin.  Monitor on Monday  3/30 repeat CBC on Monday monitor hemoglobin  4/3 stable continue to monitor.  Stool Occult blood  ordered  4/4 OB stool for collection   4/5 fecal occult blood negative, slight downtrend to 8.2.  Continue to monitor check tomorrow Saturday and Monday.    Primary hypertension  Assessment & Plan  On norvasc   Hydralazine prn  Continue to monitor bp  BP  Min: 103/65  Max: 133/76    3/29: BP mostly within normal limit on amlodipine 2.5mg daily, cont  4/3 BPs at times low in the daytime.  Will hold amlodipine in order to encourage more upright mobility  4/5: Patient's home amlodipine 2.5 is on hold.  Blood pressures have been borderline but patient is asymptomatic.  Anticipate discharging off of amlodipine.    GERD (gastroesophageal reflux disease)  Assessment & Plan  On pepcid  3/15: Patient states that she does not take Reglan at home and is okay with DC.  Does not have a history of slow gut motility.  DC Reglan   4/5: Switched from Pepcid to Protonix given severe belching and concern for lining of esophagus given on Eliquis.    History of DVT of lower extremity/pulmonary embolus, in January 2014, provoked after surgery  Assessment & Plan  On lovenox prophylactic dosing, cleared by  neurosurgery      Malignant neoplasm of uterus, part unspecified (CMD)  Assessment & Plan  Hx of adjuvant radiation and chemotherapy  3/15: Currently on Megace 80 mg every 12 hours for endometrial cancer history, not as appetite stimulant.  Continue Megace    Anxiety: Anxiety and lack of confidence is limiting progress in therapy.  Reached out to neuropsychologist who will be seeing patient.  Discussed with patient at length  3/22: Patient was seen by neuropsychology.  Patient is feeling better today and more confident was able to get out of bed yesterday afternoon and today after prolonged discussion and encouragement.  Continue to provide support, continue to be followed by neuropsychology  3/26 patient's mood appears to be stable continue to monitor  3/27: Patient has severe anxiety surrounding out of bed activity.  Attempted to  patient confidence to develop techniques to deal with her anxiety/feel a fall from lying given patient will be discharging home at some point to her family in [place]  3/28: improved confidence today, improved out of bed activities, cont  3/30: Continue to encourage out of bed activity.  Patient continues to be anxious  4/5: Anxiety and confidence is improving with more therapy and upright mobility.  Also feels more energy after starting steroids.    Thrombocytosis  3/22: Improving to 695 from 803.  Possibly reactive.  Recent Labs   Lab 04/05/24  0553 04/03/24  0534 04/01/24  0641   * 528* 488*     3/27: PLT cont to downtrend, monitor  3/28: Order CBC tomorrow to monitor  3/30 last platelet 479 downtrending monitor  4/5: Platelets today still in the 500s slightly elevated, continue to monitor    DVT prophylaxis Lovenox      Principal Problem:    S/P laminectomy with spinal fusion  Active Problems:    Hyponatremia    Impaired mobility and activities of daily living    Urinary retention    Constipation    Acute cholecystitis    Malignant neoplasm of uterus, part unspecified  (CMD)    History of DVT of lower extremity    GERD (gastroesophageal reflux disease)    Primary hypertension    Acute blood loss anemia    Acute post-operative pain    Cauda equina syndrome (CMD)    Thoracic disc herniation      Supine - Sit       Supine to sit  supervision    Sit to supine  moderate assist          Transfers       Sit to stand  contact guard/touching/steadying assist    Sit to stand trial 2  moderate assist  with rolling walker    Stand to sit  contact guard/touching/steadying assist    Stand pivot  contact guard/touching/steadying assist          Gait and Wheelchair       Ambulation device  gait belt; 2-wheeled walker    Distance 1st trial  50    Distance 2nd trial  25    Distance 3rd trial  25    Distance 4th trial  14    Assist level  contact guard/touching/steadying assist    Second ambulation device  2-wheeled walker    Distance 1st trial  25    Distance 2nd trial  25    Assist level  moderate assist          Stairs       Assist level  not attempted due to not medically appropriate or safe          Self Cares       Oral hygiene  set up  tried standing but needed to sit because she felt her knees were going to give out,    Grooming  set up    Bathing  --  washed upper body only at a SBA    Upper body dressing  set up    Lower body dressing  contact guard/touching/steadying assist    Footwear  set up    Toileting  moderate assist  twice in a session    Toilet transfer  contact guard/touching/steadying assist  twice in a session    Shower transfer  not attempted due to not medically appropriate or safe          Feeding       None          Com/Cog       None                  4/5/2024  ATTENDING ATTESTATION     I saw and examined the patient.  I discussed the patient with   Bushra Simon  . I have personally performed the pertinent parts of the history and physical examination. I have personally reviewed patient's vitals, lab findings, consult notes, and radiologic images.     Plan as noted  including medical decision making has been formulated by me and discussed with the NP, as well as any consultants necessary. I agree with the note as written with changes made within the note and exceptions, if any, noted below.    Margaret Danielson MD         I spent 55 minutes on evaluation, chart review, review of labs, review of consultant notes, discussion with NP, review of physical occupational and speech therapy notes and nursing.    Margaret Danielson MD  4/5/2024 4/5 Portions of the MDM and PE completed by  Bushra AGUILERA

## 2024-11-28 ENCOUNTER — TRANSCRIPTION ENCOUNTER (OUTPATIENT)
Age: 54
End: 2024-11-28

## 2024-11-28 VITALS
RESPIRATION RATE: 17 BRPM | SYSTOLIC BLOOD PRESSURE: 122 MMHG | OXYGEN SATURATION: 97 % | TEMPERATURE: 98 F | DIASTOLIC BLOOD PRESSURE: 84 MMHG | HEART RATE: 88 BPM

## 2024-11-28 LAB
ANION GAP SERPL CALC-SCNC: 12 MMOL/L — SIGNIFICANT CHANGE UP (ref 5–17)
BUN SERPL-MCNC: 11 MG/DL — SIGNIFICANT CHANGE UP (ref 7–23)
CALCIUM SERPL-MCNC: 8.9 MG/DL — SIGNIFICANT CHANGE UP (ref 8.4–10.5)
CHLORIDE SERPL-SCNC: 106 MMOL/L — SIGNIFICANT CHANGE UP (ref 96–108)
CO2 SERPL-SCNC: 23 MMOL/L — SIGNIFICANT CHANGE UP (ref 22–31)
CREAT SERPL-MCNC: 1.06 MG/DL — SIGNIFICANT CHANGE UP (ref 0.5–1.3)
CULTURE RESULTS: SIGNIFICANT CHANGE UP
EGFR: 82 ML/MIN/1.73M2 — SIGNIFICANT CHANGE UP
GLUCOSE SERPL-MCNC: 148 MG/DL — HIGH (ref 70–99)
HCT VFR BLD CALC: 39.7 % — SIGNIFICANT CHANGE UP (ref 39–50)
HGB BLD-MCNC: 13.4 G/DL — SIGNIFICANT CHANGE UP (ref 13–17)
MAGNESIUM SERPL-MCNC: 1.9 MG/DL — SIGNIFICANT CHANGE UP (ref 1.6–2.6)
MCHC RBC-ENTMCNC: 28.5 PG — SIGNIFICANT CHANGE UP (ref 27–34)
MCHC RBC-ENTMCNC: 33.8 G/DL — SIGNIFICANT CHANGE UP (ref 32–36)
MCV RBC AUTO: 84.5 FL — SIGNIFICANT CHANGE UP (ref 80–100)
NRBC # BLD: 0 /100 WBCS — SIGNIFICANT CHANGE UP (ref 0–0)
PHOSPHATE SERPL-MCNC: 2.5 MG/DL — SIGNIFICANT CHANGE UP (ref 2.5–4.5)
PLATELET # BLD AUTO: 209 K/UL — SIGNIFICANT CHANGE UP (ref 150–400)
POTASSIUM SERPL-MCNC: 4 MMOL/L — SIGNIFICANT CHANGE UP (ref 3.5–5.3)
POTASSIUM SERPL-SCNC: 4 MMOL/L — SIGNIFICANT CHANGE UP (ref 3.5–5.3)
RBC # BLD: 4.7 M/UL — SIGNIFICANT CHANGE UP (ref 4.2–5.8)
RBC # FLD: 12.2 % — SIGNIFICANT CHANGE UP (ref 10.3–14.5)
SODIUM SERPL-SCNC: 141 MMOL/L — SIGNIFICANT CHANGE UP (ref 135–145)
SPECIMEN SOURCE: SIGNIFICANT CHANGE UP
WBC # BLD: 11.63 K/UL — HIGH (ref 3.8–10.5)
WBC # FLD AUTO: 11.63 K/UL — HIGH (ref 3.8–10.5)

## 2024-11-28 PROCEDURE — 74176 CT ABD & PELVIS W/O CONTRAST: CPT | Mod: MC

## 2024-11-28 PROCEDURE — 87040 BLOOD CULTURE FOR BACTERIA: CPT

## 2024-11-28 PROCEDURE — 83735 ASSAY OF MAGNESIUM: CPT

## 2024-11-28 PROCEDURE — 83690 ASSAY OF LIPASE: CPT

## 2024-11-28 PROCEDURE — 87086 URINE CULTURE/COLONY COUNT: CPT

## 2024-11-28 PROCEDURE — 80048 BASIC METABOLIC PNL TOTAL CA: CPT

## 2024-11-28 PROCEDURE — 85025 COMPLETE CBC W/AUTO DIFF WBC: CPT

## 2024-11-28 PROCEDURE — 99239 HOSP IP/OBS DSCHRG MGMT >30: CPT

## 2024-11-28 PROCEDURE — C2617: CPT

## 2024-11-28 PROCEDURE — 85027 COMPLETE CBC AUTOMATED: CPT

## 2024-11-28 PROCEDURE — 99285 EMERGENCY DEPT VISIT HI MDM: CPT

## 2024-11-28 PROCEDURE — 36415 COLL VENOUS BLD VENIPUNCTURE: CPT

## 2024-11-28 PROCEDURE — 93005 ELECTROCARDIOGRAM TRACING: CPT

## 2024-11-28 PROCEDURE — 76000 FLUOROSCOPY <1 HR PHYS/QHP: CPT

## 2024-11-28 PROCEDURE — 96375 TX/PRO/DX INJ NEW DRUG ADDON: CPT

## 2024-11-28 PROCEDURE — 85610 PROTHROMBIN TIME: CPT

## 2024-11-28 PROCEDURE — 71045 X-RAY EXAM CHEST 1 VIEW: CPT

## 2024-11-28 PROCEDURE — 96374 THER/PROPH/DIAG INJ IV PUSH: CPT

## 2024-11-28 PROCEDURE — 86850 RBC ANTIBODY SCREEN: CPT

## 2024-11-28 PROCEDURE — 81001 URINALYSIS AUTO W/SCOPE: CPT

## 2024-11-28 PROCEDURE — 86901 BLOOD TYPING SEROLOGIC RH(D): CPT

## 2024-11-28 PROCEDURE — 96376 TX/PRO/DX INJ SAME DRUG ADON: CPT

## 2024-11-28 PROCEDURE — 85730 THROMBOPLASTIN TIME PARTIAL: CPT

## 2024-11-28 PROCEDURE — 86900 BLOOD TYPING SEROLOGIC ABO: CPT

## 2024-11-28 PROCEDURE — 84100 ASSAY OF PHOSPHORUS: CPT

## 2024-11-28 PROCEDURE — 82962 GLUCOSE BLOOD TEST: CPT

## 2024-11-28 PROCEDURE — 80053 COMPREHEN METABOLIC PANEL: CPT

## 2024-11-28 RX ORDER — TAMSULOSIN HYDROCHLORIDE 0.4 MG/1
1 CAPSULE ORAL
Qty: 7 | Refills: 0
Start: 2024-11-28 | End: 2024-12-04

## 2024-11-28 RX ADMIN — ACETAMINOPHEN 500MG 650 MILLIGRAM(S): 500 TABLET, COATED ORAL at 05:03

## 2024-11-28 RX ADMIN — ACETAMINOPHEN 500MG 650 MILLIGRAM(S): 500 TABLET, COATED ORAL at 06:03

## 2024-11-28 NOTE — DISCHARGE NOTE PROVIDER - NSDCMRMEDTOKEN_GEN_ALL_CORE_FT
atorvastatin 10 mg oral tablet: 1 tab(s) orally once a day  clotrimazole-betamethasone dipropionate 1%-0.05% topical cream: Apply topically to affected area 2 times a day   Flomax 0.4 mg oral capsule: 1 cap(s) orally once a day

## 2024-11-28 NOTE — PROGRESS NOTE ADULT - SUBJECTIVE AND OBJECTIVE BOX
INTERVAL HPI/OVERNIGHT EVENTS:  No acute events overnight. Patient seen at bedside  patient denies n/v, chest pain, sob.     VITALS:    T(F): 98.2 (11-28-24 @ 04:40), Max: 98.2 (11-27-24 @ 17:20)  HR: 108 (11-28-24 @ 04:40) (73 - 108)  BP: 141/80 (11-28-24 @ 04:40) (111/60 - 165/91)  RR: 18 (11-28-24 @ 04:40) (13 - 24)  SpO2: 96% (11-28-24 @ 04:40) (94% - 99%)  Wt(kg): --    I&O's Detail    26 Nov 2024 07:01  -  27 Nov 2024 07:00  --------------------------------------------------------  IN:    sodium chloride 0.9%: 720 mL  Total IN: 720 mL    OUT:    Voided (mL): 200 mL  Total OUT: 200 mL    Total NET: 520 mL      27 Nov 2024 07:01  -  28 Nov 2024 05:52  --------------------------------------------------------  IN:    sodium chloride 0.9%: 1680 mL  Total IN: 1680 mL    OUT:    Oral Fluid: 0 mL    Voided (mL): 2325 mL  Total OUT: 2325 mL    Total NET: -645 mL          MEDICATIONS:    ANTIBIOTICS:      PAIN CONTROL:  acetaminophen     Tablet .. 650 milliGRAM(s) Oral every 6 hours PRN  morphine  - Injectable 2 milliGRAM(s) IV Push every 6 hours PRN  ondansetron    Tablet 4 milliGRAM(s) Oral every 8 hours PRN  oxyCODONE    IR 5 milliGRAM(s) Oral once PRN       MEDS:  phenazopyridine 100 milliGRAM(s) Oral once PRN  tamsulosin 0.4 milliGRAM(s) Oral at bedtime      HEME/ONC        PHYSICAL EXAM:  General: No acute distress.  Alert and Oriented x 3  Abdominal Exam: soft nt/nd.    Exam: Negative SP tenderness.      LABS:                        13.5   8.05  )-----------( 192      ( 27 Nov 2024 05:30 )             40.8     11-27    140  |  109[H]  |  16  ----------------------------<  104[H]  4.0   |  23  |  1.54[H]    Ca    7.7[L]      27 Nov 2024 05:30    TPro  7.5  /  Alb  3.7  /  TBili  0.4  /  DBili  x   /  AST  29  /  ALT  34  /  AlkPhos  85  11-26    PT/INR - ( 27 Nov 2024 05:30 )   PT: 11.0 sec;   INR: 0.94          PTT - ( 27 Nov 2024 05:30 )  PTT:27.8 sec  Urinalysis Basic - ( 27 Nov 2024 05:30 )    Color: x / Appearance: x / SG: x / pH: x  Gluc: 104 mg/dL / Ketone: x  / Bili: x / Urobili: x   Blood: x / Protein: x / Nitrite: x   Leuk Esterase: x / RBC: x / WBC x   Sq Epi: x / Non Sq Epi: x / Bacteria: x        RADIOLOGY & ADDITIONAL TESTS:

## 2024-11-28 NOTE — DISCHARGE NOTE NURSING/CASE MANAGEMENT/SOCIAL WORK - NSDCPEFALRISK_GEN_ALL_CORE
For information on Fall & Injury Prevention, visit: [email or web address] OR  [email or web address] OR  https://www.cdc.gov/steadi/patient.html

## 2024-11-28 NOTE — DISCHARGE NOTE PROVIDER - HOSPITAL COURSE
54 year old male with HLD who presented to [facility] with acute onset left flank pain. Patient denies any trauma. Denies similar pain in the past. He describes the pain as sharp, 10/10, radiating from the back to around the flank. Denies radiation to the groin. Denies urinary symptoms. Denies fevers, chills, n/v/c/d. No other complaints. Patient had a CT abdomen and pelvis revealing moderate left hydroureteronephrosis with a 2mm stone in the proximal left ureter with pernephric fluid. The patient's Cr increased, despite being given IV fluids. He was transferred to [facility] for further workup of the left renal stone and c/f ureteral stricture. Patient was consented for and underwent cystoscopy with Left retrograde pyelorgraphy and stent placement. Identified small site of narrowing on retrograde imaging likely c/w stricture. Surgical course uncomplicated. Patient tolerated procedure well and returned to regional floor. Remained afebrile and hemodynamically stable and ready and able for discharge home on POD1.

## 2024-11-28 NOTE — DISCHARGE NOTE NURSING/CASE MANAGEMENT/SOCIAL WORK - FINANCIAL ASSISTANCE
[facility] provides services at a reduced cost to those who are determined to be eligible through [facility]’s financial assistance program. Information regarding [facility]’s financial assistance program can be found by going to [email or web address] or by calling [phone].

## 2024-11-28 NOTE — DISCHARGE NOTE PROVIDER - NSDCFUADDINST_GEN_ALL_CORE_FT
URETEROSCOPY    GENERAL: It is common to have blood in your urine after your procedure. It may be pink or even red; and it is important to increase fluid intake to 2-3L of water per day to keep the urine as clear as possible. Please inform your doctor if you have a significant amount of clot in the urine or if you are unable to void at all. The urine may clear and then become bloody again especially as you are more physically active.    STENT: You may have an internal stent (a hollow tube that runs from the kidney to your bladder) after your procedure, which helps urine drain from the kidney to your bladder. Some patients experience urinary frequency, burning, or even back pain (especially with urination). These sensations will gradually get better. Increasing your fluid intake can also improve these symptoms. While the stent is in place, your urine may continue to be bloody. This stent is temporary and must be removed by your urologist as an outpatient with in 3 months unless otherwise specified. If your stent is on a string, it is secured to your leg or genitalia with an adhesive bandage. Do not pull on the string, do not remove the bandage, do not insert anything intravaginally/intraurethrally, and do not engage in sexual intercourse until after the stent is removed at your post-operative appointment.    UROLOGIC MEDICATIONS:  The following medications may have been sent to your pharmacy for stent related discomfort: Flomax (tamsulosin) 0.4mg at bedtime until stent removed.    PAIN: You may take Tylenol (acetaminophen) 650-975mg and/or Motrin (ibuprofen) 400-600mg, both available over the counter, for pain every 6 hours as needed. Do not exceed 4000mg of Tylenol (acetaminophen) daily. You may alternate these medications such that you take one or the other every 3 hours for around the clock pain coverage. If you have a stent, the following medications may have been sent to your pharmacy for stent related discomfort: Flomax (tamsulosin) 0.4mg at bedtime until stent removed    STOOL SOFTENERS: Do not allow yourself to become constipated as straining may cause bleeding. Take stool softeners or a laxative (ex. Miralax, Colace, Senokot, ExLax, etc), available over the counter, if needed.    BATHING: You may shower or bathe.    DIET: You may resume your regular diet and regular medication regimen.    ACTIVITY: No heavy lifting or strenuous exercise until you are evaluated at your post-operative appointment. Otherwise, you may return to your usual level of physical activity.    FOLLOW-UP: If you did not already schedule your post-operative appointment, please call your urologist to schedule and follow-up appointment.    CALL YOUR UROLOGIST IF: You have any bleeding that does not stop, inability to void >8 hours, fever over 100.4 F, chills, persistent nausea/vomiting, changes in your incision concerning for infection, or if your pain is not controlled on your discharge pain medications.

## 2024-11-28 NOTE — DISCHARGE NOTE NURSING/CASE MANAGEMENT/SOCIAL WORK - NSDCVIVACCINE_GEN_ALL_CORE_FT
Tdap; 08-Dec-2018 00:38; Symone Ramirez (RN); [employer]; [id number] (Exp. Date: 13-Feb-2021); IntraMuscular; Deltoid Left.; 0.5 milliLiter(s); VIS (VIS Published: 09-May-2013, VIS Presented: 08-Dec-2018);

## 2024-11-28 NOTE — DISCHARGE NOTE NURSING/CASE MANAGEMENT/SOCIAL WORK - PATIENT PORTAL LINK FT
You can access the FollowMyHealth Patient Portal offered by [facility] by registering at the following website: [email or web address]. By joining [employer]’s FollowMyHealth portal, you will also be able to view your health information using other applications (apps) compatible with our system.

## 2024-11-28 NOTE — DISCHARGE NOTE PROVIDER - CARE PROVIDER_API CALL
Saúl Ron  Urology  [address], [address]  [place] [address]  Phone: [phone]  Fax: [phone]  Follow Up Time: 2 weeks

## 2024-12-02 LAB
CULTURE RESULTS: SIGNIFICANT CHANGE UP
SPECIMEN SOURCE: SIGNIFICANT CHANGE UP

## 2024-12-06 DIAGNOSIS — N17.9 ACUTE KIDNEY FAILURE, UNSPECIFIED: ICD-10-CM

## 2024-12-06 DIAGNOSIS — N13.2 HYDRONEPHROSIS WITH RENAL AND URETERAL CALCULOUS OBSTRUCTION: ICD-10-CM

## 2024-12-06 DIAGNOSIS — E78.5 HYPERLIPIDEMIA, UNSPECIFIED: ICD-10-CM

## 2024-12-16 ENCOUNTER — APPOINTMENT (OUTPATIENT)
Dept: UROLOGY | Facility: CLINIC | Age: 54
End: 2024-12-16
Payer: COMMERCIAL

## 2024-12-16 ENCOUNTER — NON-APPOINTMENT (OUTPATIENT)
Age: 54
End: 2024-12-16

## 2024-12-16 VITALS
HEART RATE: 88 BPM | WEIGHT: 205 LBS | OXYGEN SATURATION: 98 % | HEIGHT: 71 IN | DIASTOLIC BLOOD PRESSURE: 84 MMHG | SYSTOLIC BLOOD PRESSURE: 121 MMHG | RESPIRATION RATE: 15 BRPM | BODY MASS INDEX: 28.7 KG/M2 | TEMPERATURE: 97.4 F

## 2024-12-16 DIAGNOSIS — N20.0 CALCULUS OF KIDNEY: ICD-10-CM

## 2024-12-16 DIAGNOSIS — N13.5 CROSSING VESSEL AND STRICTURE OF URETER W/OUT HYDRONEPHROSIS: ICD-10-CM

## 2024-12-16 PROCEDURE — 99214 OFFICE O/P EST MOD 30 MIN: CPT

## 2024-12-16 RX ORDER — ATORVASTATIN CALCIUM 10 MG/1
10 TABLET, FILM COATED ORAL
Refills: 0 | Status: ACTIVE | COMMUNITY

## 2024-12-16 RX ORDER — TAMSULOSIN HYDROCHLORIDE 0.4 MG/1
0.4 CAPSULE ORAL
Qty: 90 | Refills: 3 | Status: ACTIVE | COMMUNITY
Start: 2024-12-16 | End: 1900-01-01

## 2024-12-18 LAB — BACTERIA UR CULT: NORMAL

## 2024-12-31 NOTE — ASU PATIENT PROFILE, ADULT - SITE
left ureter Rituxan Counseling:  I discussed with the patient the risks of Rituxan infusions. Side effects can include infusion reactions, severe drug rashes including mucocutaneous reactions, reactivation of latent hepatitis and other infections and rarely progressive multifocal leukoencephalopathy.  All of the patient's questions and concerns were addressed.

## 2024-12-31 NOTE — ASU PATIENT PROFILE, ADULT - REASON FOR ADMISSION, PROFILE
PROSTATE FUSION BIOPSY CYSTOSCOPY, LEFT URETEROSCOPY WITH ENDOPYELOTOMY/ BALLOON DILATION AND LEFT URETERAL STENT EXCHANGE

## 2024-12-31 NOTE — ASU PATIENT PROFILE, ADULT - NS PREOP UNDERSTANDS INFO
Informed pt about surgery time, and when to arrive by. Last meal @0000, no solid foods including dairy products/substitutes, chewing gum or hard candy. Can drink water clear liquids up to 3 hours before surgery, unless otherwise instructed by MD. Bring photo ID, insurance card and form of payment to the ground floor. Must have an escort that is 18+ to take them home after surgery and they must have a photo ID with them to enter the building. Remove all jewelry, piercings, and contacts before coming to the hospital./yes

## 2025-01-02 ENCOUNTER — RESULT REVIEW (OUTPATIENT)
Age: 55
End: 2025-01-02

## 2025-01-02 ENCOUNTER — APPOINTMENT (OUTPATIENT)
Dept: UROLOGY | Facility: AMBULATORY SURGERY CENTER | Age: 55
End: 2025-01-02

## 2025-01-02 ENCOUNTER — TRANSCRIPTION ENCOUNTER (OUTPATIENT)
Age: 55
End: 2025-01-02

## 2025-01-02 ENCOUNTER — OUTPATIENT (OUTPATIENT)
Dept: OUTPATIENT SERVICES | Facility: HOSPITAL | Age: 55
LOS: 1 days | Discharge: HOME | End: 2025-01-02
Payer: COMMERCIAL

## 2025-01-02 VITALS
HEART RATE: 82 BPM | RESPIRATION RATE: 15 BRPM | DIASTOLIC BLOOD PRESSURE: 87 MMHG | SYSTOLIC BLOOD PRESSURE: 131 MMHG | TEMPERATURE: 98 F | HEIGHT: 71 IN | OXYGEN SATURATION: 97 % | WEIGHT: 213.63 LBS

## 2025-01-02 VITALS
SYSTOLIC BLOOD PRESSURE: 121 MMHG | DIASTOLIC BLOOD PRESSURE: 71 MMHG | HEART RATE: 68 BPM | OXYGEN SATURATION: 99 % | RESPIRATION RATE: 17 BRPM

## 2025-01-02 DIAGNOSIS — Z86.69 PERSONAL HISTORY OF OTHER DISEASES OF THE NERVOUS SYSTEM AND SENSE ORGANS: Chronic | ICD-10-CM

## 2025-01-02 PROCEDURE — 52352 CYSTOURETERO W/STONE REMOVE: CPT | Mod: LT

## 2025-01-02 PROCEDURE — 52332 CYSTOSCOPY AND TREATMENT: CPT | Mod: LT

## 2025-01-02 PROCEDURE — 88300 SURGICAL PATH GROSS: CPT | Mod: 26

## 2025-01-02 PROCEDURE — 52341 CYSTO W/URETER STRICTURE TX: CPT | Mod: 59,LT

## 2025-01-02 PROCEDURE — 74420 UROGRAPHY RTRGR +-KUB: CPT | Mod: 26

## 2025-01-02 DEVICE — GUIDEWIRE SENSOR DUAL-FLEX NITINOL STRAIGHT .038" X 150CM: Type: IMPLANTABLE DEVICE | Status: FUNCTIONAL

## 2025-01-02 DEVICE — STONE BASKET ZEROTIP NITINOL 4-WIRE 1.9FR 120CM X 12MM: Type: IMPLANTABLE DEVICE | Status: FUNCTIONAL

## 2025-01-02 DEVICE — URETERAL STENT TRIA SOFT 6FR 26MM: Type: IMPLANTABLE DEVICE | Status: FUNCTIONAL

## 2025-01-02 DEVICE — URETERAL CATH DUAL LUMEN 10FR 54CM: Type: IMPLANTABLE DEVICE | Status: FUNCTIONAL

## 2025-01-02 DEVICE — BALLOON CATH UROMAX ULTRA 15FR X 4CM: Type: IMPLANTABLE DEVICE | Status: FUNCTIONAL

## 2025-01-02 RX ORDER — APREPITANT 40 MG/1
40 CAPSULE ORAL ONCE
Refills: 0 | Status: COMPLETED | OUTPATIENT
Start: 2025-01-02 | End: 2025-01-02

## 2025-01-02 RX ORDER — ACETAMINOPHEN 80 MG/.8ML
1000 SOLUTION/ DROPS ORAL ONCE
Refills: 0 | Status: COMPLETED | OUTPATIENT
Start: 2025-01-02 | End: 2025-01-02

## 2025-01-02 RX ORDER — ONDANSETRON 4 MG/1
4 TABLET ORAL ONCE
Refills: 0 | Status: DISCONTINUED | OUTPATIENT
Start: 2025-01-02 | End: 2025-01-02

## 2025-01-02 RX ORDER — OXYCODONE HCL 15 MG
5 TABLET ORAL ONCE
Refills: 0 | Status: DISCONTINUED | OUTPATIENT
Start: 2025-01-02 | End: 2025-01-02

## 2025-01-02 RX ORDER — PHENAZOPYRIDINE HCL 200 MG
200 TABLET ORAL THREE TIMES A DAY
Refills: 0 | Status: DISCONTINUED | OUTPATIENT
Start: 2025-01-02 | End: 2025-01-02

## 2025-01-02 RX ORDER — SODIUM CHLORIDE 9 MG/ML
500 INJECTION, SOLUTION INTRAVENOUS
Refills: 0 | Status: DISCONTINUED | OUTPATIENT
Start: 2025-01-02 | End: 2025-01-02

## 2025-01-02 RX ORDER — HYDROMORPHONE HCL 4 MG
0.5 TABLET ORAL
Refills: 0 | Status: DISCONTINUED | OUTPATIENT
Start: 2025-01-02 | End: 2025-01-02

## 2025-01-02 RX ADMIN — Medication 200 MILLIGRAM(S): at 19:12

## 2025-01-02 RX ADMIN — Medication 5 MILLIGRAM(S): at 19:11

## 2025-01-02 RX ADMIN — ACETAMINOPHEN 1000 MILLIGRAM(S): 80 SOLUTION/ DROPS ORAL at 12:10

## 2025-01-02 RX ADMIN — APREPITANT 40 MILLIGRAM(S): 40 CAPSULE ORAL at 12:10

## 2025-01-02 RX ADMIN — Medication 5 MILLIGRAM(S): at 20:11

## 2025-01-02 RX ADMIN — ACETAMINOPHEN 1000 MILLIGRAM(S): 80 SOLUTION/ DROPS ORAL at 21:27

## 2025-01-02 RX ADMIN — ACETAMINOPHEN 1000 MILLIGRAM(S): 80 SOLUTION/ DROPS ORAL at 21:57

## 2025-01-02 NOTE — ASU DISCHARGE PLAN (ADULT/PEDIATRIC) - ASU DC SPECIAL INSTRUCTIONSFT
URETEROSCOPY    GENERAL: It is common to have blood in your urine after your procedure. It may be pink or even red; and it is important to increase fluid intake to 2-3L of water per day to keep the urine as clear as possible. Please inform your doctor if you have a significant amount of clot in the urine or if you are unable to void at all. The urine may clear and then become bloody again especially as you are more physically active.    STENT: You may have an internal stent (a hollow tube that runs from the kidney to your bladder) after your procedure, which helps urine drain from the kidney to your bladder. Some patients experience urinary frequency, burning, or even back pain (especially with urination). These sensations will gradually get better. Increasing your fluid intake can also improve these symptoms. While the stent is in place, your urine may continue to be bloody. This stent is temporary and must be removed by your urologist as an outpatient with in 3 months unless otherwise specified. If your stent is on a string, it is secured to your leg or genitalia with an adhesive bandage. Do not pull on the string, do not remove the bandage, do not insert anything intravaginally/intraurethrally, and do not engage in sexual intercourse until after the stent is removed at your post-operative appointment.    CATHETER: Some patients are sent home with a Vital catheter, while others go home urinating on their own. A Vital catheter continuously drains the urine from the bladder. If you still have a catheter, the nurses will review instructions and care before you go home. For men, you may have a prescription for lidocaine jelly to apply to the tip of your penis, as needed, for catheter related discomfort.     PAIN: You may take Tylenol (acetaminophen) 650-975mg and/or Motrin (ibuprofen) 400-600mg, both available over the counter, for pain every 6 hours as needed. Do not exceed 4000mg of Tylenol (acetaminophen) daily. You may alternate these medications such that you take one or the other every 3 hours for around the clock pain coverage.    ANTIBIOTICS: You may be given a prescription for an antibiotic, please take this medication as instructed and be sure to complete the entire course.     STOOL SOFTENERS: Do not allow yourself to become constipated as straining may cause bleeding. Take stool softeners or a laxative (ex. Miralax, Colace, Senokot, ExLax, etc), available over the counter, if needed.    ANTICOAGULATION: If you are taking any blood thinning medications, please discuss with your urologist prior to restarting these medications unless otherwise specified.    BATHING: You may shower or bathe.    DIET: You may resume your regular diet and regular medication regimen.    ACTIVITY: No heavy lifting or strenuous exercise until you are evaluated at your post-operative appointment. Otherwise, you may return to your usual level of physical activity.    FOLLOW-UP: If you did not already schedule your post-operative appointment, please call your urologist to schedule and follow-up appointment.    CALL YOUR UROLOGIST IF: You have any bleeding that does not stop, inability to void >8 hours, fever over 100.4 F, chills, persistent nausea/vomiting, changes in your incision concerning for infection, or if your pain is not controlled on your discharge pain medications.

## 2025-01-02 NOTE — BRIEF OPERATIVE NOTE - NSICDXBRIEFPROCEDURE_GEN_ALL_CORE_FT
PROCEDURES:  Cystoscopy with left ureteroscopy 02-Jan-2025 15:49:36 retrograde pyelogram, left ureteral stent exchange Carmela Fleming   PROCEDURES:  Cystoscopy with left ureteroscopy 02-Jan-2025 15:49:36 retrograde pyelogram, left ureteral stent exchange, stone basketing, left ureteral dilation Carmela Fleming

## 2025-01-02 NOTE — BRIEF OPERATIVE NOTE - NSICDXBRIEFPOSTOP_GEN_ALL_CORE_FT
POST-OP DIAGNOSIS:  Kidney stone on left side 02-Jan-2025 15:49:53  Carmela Fleming   POST-OP DIAGNOSIS:  Ureteral stricture, left 02-Jan-2025 18:19:25  Carmela Fleming  Kidney stone on left side 02-Jan-2025 15:49:53  Carmela Fleming

## 2025-01-02 NOTE — ASU DISCHARGE PLAN (ADULT/PEDIATRIC) - FINANCIAL ASSISTANCE
[facility] provides services at a reduced cost to those who are determined to be eligible through [facility]’s financial assistance program. Information regarding [facility]’s financial assistance program can be found by going to [email or web address] or by calling [phone].

## 2025-01-02 NOTE — ASU DISCHARGE PLAN (ADULT/PEDIATRIC) - PROCEDURE
Cystoscopy, left ureteroscopy, retrograde pyelogram, left ureteral stent exchange Cystoscopy, left ureteroscopy, retrograde pyelogram, stone basketing, left ureteral dilation, left ureteral stent exchange

## 2025-01-02 NOTE — BRIEF OPERATIVE NOTE - NSICDXBRIEFPREOP_GEN_ALL_CORE_FT
PRE-OP DIAGNOSIS:  Kidney stone on left side 02-Jan-2025 15:49:50  Carmela Fleming   PRE-OP DIAGNOSIS:  Kidney stone on left side 02-Jan-2025 15:49:50  Carmela Fleming  Ureteral stricture, left 02-Jan-2025 18:19:21  Carmela Fleming

## 2025-01-02 NOTE — ASU PREOP CHECKLIST - WEIGHT IN KG
PROCEDURE:   XR Forearm. 

 

CLINICAL INDICATION:   Trauma.  Pain. 

 

TECHNIQUE:   AP and lateral views of the left forearm were obtained. 

 

COMPARISON:   No prior studies are available for comparison. 

 

FINDINGS:

 

There is no fracture.  Joint relationships are maintained.  Bone mineralization is within normal manzo
its.  Soft tissues are unremarkable.

 

IMPRESSION:

 

1.  Unremarkable left forearm x-ray series. 

 

 

RPTAT: HMVK

_____________________________________________ 

.Johnathan Thomas MD, MD           Date    Time 

Electronically viewed and signed by .Johnathan Thomas MD, MD on 10/21/2017 02:50 

 

D:  10/21/2017 02:50  T:  10/21/2017 02:50

.K/ 96.9

## 2025-01-06 PROBLEM — E78.5 HYPERLIPIDEMIA, UNSPECIFIED: Chronic | Status: ACTIVE | Noted: 2024-12-31

## 2025-02-03 ENCOUNTER — APPOINTMENT (OUTPATIENT)
Dept: UROLOGY | Facility: CLINIC | Age: 55
End: 2025-02-03
Payer: COMMERCIAL

## 2025-02-03 VITALS
SYSTOLIC BLOOD PRESSURE: 139 MMHG | HEIGHT: 71 IN | OXYGEN SATURATION: 98 % | HEART RATE: 90 BPM | BODY MASS INDEX: 28.7 KG/M2 | DIASTOLIC BLOOD PRESSURE: 90 MMHG | RESPIRATION RATE: 15 BRPM | WEIGHT: 205 LBS

## 2025-02-03 DIAGNOSIS — N13.5 CROSSING VESSEL AND STRICTURE OF URETER W/OUT HYDRONEPHROSIS: ICD-10-CM

## 2025-02-03 DIAGNOSIS — N20.0 CALCULUS OF KIDNEY: ICD-10-CM

## 2025-02-03 PROCEDURE — 52310 CYSTOSCOPY AND TREATMENT: CPT

## 2025-02-03 PROCEDURE — 99214 OFFICE O/P EST MOD 30 MIN: CPT | Mod: 25

## 2025-02-05 LAB — BACTERIA UR CULT: NORMAL

## 2025-02-28 ENCOUNTER — APPOINTMENT (OUTPATIENT)
Dept: NUCLEAR MEDICINE | Facility: HOSPITAL | Age: 55
End: 2025-02-28
Payer: COMMERCIAL

## 2025-02-28 ENCOUNTER — OUTPATIENT (OUTPATIENT)
Dept: OUTPATIENT SERVICES | Facility: HOSPITAL | Age: 55
LOS: 1 days | End: 2025-02-28

## 2025-02-28 DIAGNOSIS — N13.5 CROSSING VESSEL AND STRICTURE OF URETER WITHOUT HYDRONEPHROSIS: ICD-10-CM

## 2025-02-28 DIAGNOSIS — Z86.69 PERSONAL HISTORY OF OTHER DISEASES OF THE NERVOUS SYSTEM AND SENSE ORGANS: Chronic | ICD-10-CM

## 2025-02-28 PROCEDURE — 78708 K FLOW/FUNCT IMAGE W/DRUG: CPT | Mod: 26

## 2025-02-28 PROCEDURE — 51702 INSERT TEMP BLADDER CATH: CPT

## 2025-03-10 ENCOUNTER — NON-APPOINTMENT (OUTPATIENT)
Age: 55
End: 2025-03-10

## 2025-03-10 ENCOUNTER — APPOINTMENT (OUTPATIENT)
Dept: UROLOGY | Facility: CLINIC | Age: 55
End: 2025-03-10
Payer: COMMERCIAL

## 2025-03-10 DIAGNOSIS — N20.0 CALCULUS OF KIDNEY: ICD-10-CM

## 2025-03-10 DIAGNOSIS — N13.5 CROSSING VESSEL AND STRICTURE OF URETER W/OUT HYDRONEPHROSIS: ICD-10-CM

## 2025-03-10 PROCEDURE — 99213 OFFICE O/P EST LOW 20 MIN: CPT | Mod: 95

## 2025-04-23 ENCOUNTER — NON-APPOINTMENT (OUTPATIENT)
Age: 55
End: 2025-04-23

## 2025-06-13 ENCOUNTER — APPOINTMENT (OUTPATIENT)
Dept: UROLOGY | Facility: CLINIC | Age: 55
End: 2025-06-13
Payer: COMMERCIAL

## 2025-06-13 VITALS
WEIGHT: 205 LBS | TEMPERATURE: 97.7 F | RESPIRATION RATE: 15 BRPM | BODY MASS INDEX: 28.7 KG/M2 | OXYGEN SATURATION: 96 % | HEIGHT: 71 IN | DIASTOLIC BLOOD PRESSURE: 88 MMHG | HEART RATE: 81 BPM | SYSTOLIC BLOOD PRESSURE: 136 MMHG

## 2025-06-13 PROCEDURE — 99214 OFFICE O/P EST MOD 30 MIN: CPT

## 2025-07-30 NOTE — ED ADULT NURSE NOTE - FALL HARM RISK TYPE OF ASSESSMENT
[FreeTextEntry1] : Ms. Luong is presenting for PHPT and Osteoporosis.   86 year old female with PMH of thyroid nodules, Osteopenia, primary hyperparathyroidism, afib (On eloquis), BCC s/o mohs surgery, GERD, HTN, HLD.   #PHPT Evaluated by Dr. Martinez, patient prefers to avoid surgery and continue observation.  PTH 56, Calcium (highest 11.1 in 2020) Most recent: Dec 2024 Calcium 10.7 4D CT scan July 2022: revealed a 1.0 cm nodule posterior to the right thyroid upper pole compatible with a parathyroid adenoma.  There was a 0.5 cm nodule posterior to the right thyroid mid-pole which was felt to likely represent a normal or possibly hyperplastic parathyroid gland. 24 hour Urine calcium 208 mg/g Vitamin D 59 --> 36.2 May 2025 Calcium 9.8, PTH 85 (on prolia)  #Osteoporosis  Menopause: Age 46, hysterectomy  DEXA July 2024: T scores: Spine: -0.4, Femoral neck: -1.9, Total hip: -2.0, Third radius:-2.4, osteopenia.  DEXA July 2022: T scores: Spine: -2.1, Femoral neck: -1.8, Total hip: -2.1, Third radius:-2.3, osteopenia.  FRACTURE RISK: Using the FRAX 10 year fracture risk calculator the patient's ten-year risk of any fracture is 14% and the patient's risk of  hip fracture is  4.1%. Fracture history: h/o 3 compression fractures in 2021  Family history: No parental history of hip fracture Prior Treatment: None  Prior HRT: Premarin x 10 years.  Current Tx: Prolia started Dec 2022   Falls: Fell twice last year, h/o 3 compression fractures in 2021  Height loss: Yes 2 inches.  Kidney stones: None  Dental health: Regular appointments, 8 dental permanent implants (10-12 years ago), has cow bone in her gums prior to implant. Pt had tooth extraction Dec 2022. Pt had tooth removal June 25th and had implant placed.  Exercise: None  Dairy intake: Milk on occasion.  Calcium supplements: 600mg QD  Multivitamin: Yes  Vitamin D supplements: 800IU daily   NEGATIVE EXCEPT: Postmenopausal status,  race, prior fracture, hyperparathyroidism.   #Thyroid Nodules  Thyroid USG May 2023: Subcentimeter nodules, 1.2cm mid colloid nodule.  F/u with Dr. Martinez Dec 2023 for repeat USG   Interval hx:  Pt had mohs surgery on her face/nose.  Pt had tooth removal June 25th and had implant placed.  Pt received her prolia shot july 14th.  Pt leaving for [place] Jan 2026 and returning april 2026. 
Daily Assessment

## (undated) DEVICE — GLV 7 PROTEXIS (WHITE)

## (undated) DEVICE — VENODYNE/SCD SLEEVE CALF MEDIUM

## (undated) DEVICE — CLIPPER BLADE GENERAL USE

## (undated) DEVICE — BOSTON SCIENTIFC ENCORE 26 INFLATOR

## (undated) DEVICE — SYR TOOMEY 50ML

## (undated) DEVICE — PACK CYSTO

## (undated) DEVICE — DRAPE C ARM 41X74"

## (undated) DEVICE — GLV 7.5 PROTEXIS (WHITE)

## (undated) DEVICE — TUBING RANGER FLUID IRRIGATION SET DISP

## (undated) DEVICE — PREP BETADINE KIT

## (undated) DEVICE — BOSTON SCIENTIFIC UROLOK II SCOPE ADAPTOR

## (undated) DEVICE — WARMING BLANKET UPPER ADULT

## (undated) DEVICE — BOSTON SCIENTIFC PUMPING SYSTEM SAPS SINGLE ACTION 10CC

## (undated) DEVICE — DRAPE C ARM UNIVERSAL

## (undated) DEVICE — POSITIONER FOAM EGG CRATE ULNAR 2PCS (PINK)

## (undated) DEVICE — DRAPE TOWEL BLUE 17" X 24"

## (undated) DEVICE — PRESSURE INFUSOR BAG 3000ML

## (undated) DEVICE — SYR LUER LOK 50CC

## (undated) DEVICE — SOL IRR BAG NS 0.9% 3000ML